# Patient Record
Sex: FEMALE | Race: WHITE | HISPANIC OR LATINO | Employment: PART TIME | URBAN - METROPOLITAN AREA
[De-identification: names, ages, dates, MRNs, and addresses within clinical notes are randomized per-mention and may not be internally consistent; named-entity substitution may affect disease eponyms.]

---

## 2017-09-27 ENCOUNTER — TRANSCRIBE ORDERS (OUTPATIENT)
Dept: LAB | Facility: CLINIC | Age: 37
End: 2017-09-27

## 2017-09-27 ENCOUNTER — APPOINTMENT (OUTPATIENT)
Dept: LAB | Facility: CLINIC | Age: 37
End: 2017-09-27
Payer: COMMERCIAL

## 2017-09-27 ENCOUNTER — ALLSCRIPTS OFFICE VISIT (OUTPATIENT)
Dept: OTHER | Facility: OTHER | Age: 37
End: 2017-09-27

## 2017-09-27 DIAGNOSIS — N64.4 MASTODYNIA: ICD-10-CM

## 2017-09-27 DIAGNOSIS — R10.11 RIGHT UPPER QUADRANT PAIN: ICD-10-CM

## 2017-09-27 DIAGNOSIS — E78.5 HYPERLIPIDEMIA: ICD-10-CM

## 2017-09-27 DIAGNOSIS — M25.439 EFFUSION OF WRIST: ICD-10-CM

## 2017-09-27 DIAGNOSIS — R10.12 LEFT UPPER QUADRANT PAIN: ICD-10-CM

## 2017-09-27 DIAGNOSIS — D64.9 ANEMIA: ICD-10-CM

## 2017-09-27 LAB
CRP SERPL QL: 18.4 MG/L
ERYTHROCYTE [SEDIMENTATION RATE] IN BLOOD: 28 MM/HOUR (ref 0–20)

## 2017-09-27 PROCEDURE — 85652 RBC SED RATE AUTOMATED: CPT

## 2017-09-27 PROCEDURE — 36415 COLL VENOUS BLD VENIPUNCTURE: CPT

## 2017-09-27 PROCEDURE — 86618 LYME DISEASE ANTIBODY: CPT

## 2017-09-27 PROCEDURE — 86140 C-REACTIVE PROTEIN: CPT

## 2017-09-27 PROCEDURE — 86430 RHEUMATOID FACTOR TEST QUAL: CPT

## 2017-09-28 LAB
B BURGDOR IGG SER IA-ACNC: 0.17
B BURGDOR IGM SER IA-ACNC: 0.19
RHEUMATOID FACT SER QL LA: NEGATIVE

## 2017-10-04 ENCOUNTER — ALLSCRIPTS OFFICE VISIT (OUTPATIENT)
Dept: OTHER | Facility: OTHER | Age: 37
End: 2017-10-04

## 2017-10-04 LAB
BILIRUB UR QL STRIP: NEGATIVE
CLARITY UR: NORMAL
COLOR UR: YELLOW
GLUCOSE (HISTORICAL): NEGATIVE
HGB UR QL STRIP.AUTO: NEGATIVE
KETONES UR STRIP-MCNC: NEGATIVE MG/DL
LEUKOCYTE ESTERASE UR QL STRIP: NEGATIVE
NITRITE UR QL STRIP: NEGATIVE
PH UR STRIP.AUTO: 6.5 [PH]
PROT UR STRIP-MCNC: NEGATIVE MG/DL
SP GR UR STRIP.AUTO: 1.01
UROBILINOGEN UR QL STRIP.AUTO: 0.2

## 2017-10-27 ENCOUNTER — TRANSCRIBE ORDERS (OUTPATIENT)
Dept: LAB | Facility: CLINIC | Age: 37
End: 2017-10-27

## 2017-10-27 ENCOUNTER — GENERIC CONVERSION - ENCOUNTER (OUTPATIENT)
Dept: OTHER | Facility: OTHER | Age: 37
End: 2017-10-27

## 2017-10-27 ENCOUNTER — APPOINTMENT (OUTPATIENT)
Dept: LAB | Facility: CLINIC | Age: 37
End: 2017-10-27

## 2017-10-27 DIAGNOSIS — D64.9 ANEMIA: ICD-10-CM

## 2017-10-27 DIAGNOSIS — E78.5 HYPERLIPIDEMIA: ICD-10-CM

## 2017-10-27 DIAGNOSIS — N64.4 MASTODYNIA: ICD-10-CM

## 2017-10-27 LAB
ALBUMIN SERPL BCP-MCNC: 3.6 G/DL (ref 3.5–5)
ALP SERPL-CCNC: 63 U/L (ref 46–116)
ALT SERPL W P-5'-P-CCNC: 26 U/L (ref 12–78)
ANION GAP SERPL CALCULATED.3IONS-SCNC: 6 MMOL/L (ref 4–13)
AST SERPL W P-5'-P-CCNC: 21 U/L (ref 5–45)
BASOPHILS # BLD AUTO: 0.04 THOUSANDS/ΜL (ref 0–0.1)
BASOPHILS NFR BLD AUTO: 1 % (ref 0–1)
BILIRUB SERPL-MCNC: 0.53 MG/DL (ref 0.2–1)
BUN SERPL-MCNC: 8 MG/DL (ref 5–25)
CALCIUM SERPL-MCNC: 8.9 MG/DL (ref 8.3–10.1)
CHLORIDE SERPL-SCNC: 105 MMOL/L (ref 100–108)
CHOLEST SERPL-MCNC: 227 MG/DL (ref 50–200)
CO2 SERPL-SCNC: 26 MMOL/L (ref 21–32)
CREAT SERPL-MCNC: 0.76 MG/DL (ref 0.6–1.3)
EOSINOPHIL # BLD AUTO: 0.11 THOUSAND/ΜL (ref 0–0.61)
EOSINOPHIL NFR BLD AUTO: 2 % (ref 0–6)
ERYTHROCYTE [DISTWIDTH] IN BLOOD BY AUTOMATED COUNT: 14.8 % (ref 11.6–15.1)
FERRITIN SERPL-MCNC: 9 NG/ML (ref 8–388)
GFR SERPL CREATININE-BSD FRML MDRD: 101 ML/MIN/1.73SQ M
GLUCOSE P FAST SERPL-MCNC: 94 MG/DL (ref 65–99)
HCT VFR BLD AUTO: 36.8 % (ref 34.8–46.1)
HDLC SERPL-MCNC: 54 MG/DL (ref 40–60)
HGB BLD-MCNC: 11.8 G/DL (ref 11.5–15.4)
IRON SERPL-MCNC: 43 UG/DL (ref 50–170)
LDLC SERPL CALC-MCNC: 129 MG/DL (ref 0–100)
LYMPHOCYTES # BLD AUTO: 1.55 THOUSANDS/ΜL (ref 0.6–4.47)
LYMPHOCYTES NFR BLD AUTO: 22 % (ref 14–44)
MCH RBC QN AUTO: 26.2 PG (ref 26.8–34.3)
MCHC RBC AUTO-ENTMCNC: 32.1 G/DL (ref 31.4–37.4)
MCV RBC AUTO: 82 FL (ref 82–98)
MONOCYTES # BLD AUTO: 0.39 THOUSAND/ΜL (ref 0.17–1.22)
MONOCYTES NFR BLD AUTO: 6 % (ref 4–12)
NEUTROPHILS # BLD AUTO: 4.96 THOUSANDS/ΜL (ref 1.85–7.62)
NEUTS SEG NFR BLD AUTO: 69 % (ref 43–75)
NRBC BLD AUTO-RTO: 0 /100 WBCS
PLATELET # BLD AUTO: 231 THOUSANDS/UL (ref 149–390)
PMV BLD AUTO: 10.4 FL (ref 8.9–12.7)
POTASSIUM SERPL-SCNC: 4.1 MMOL/L (ref 3.5–5.3)
PROT SERPL-MCNC: 7.6 G/DL (ref 6.4–8.2)
RBC # BLD AUTO: 4.5 MILLION/UL (ref 3.81–5.12)
SODIUM SERPL-SCNC: 137 MMOL/L (ref 136–145)
TRIGL SERPL-MCNC: 222 MG/DL
WBC # BLD AUTO: 7.08 THOUSAND/UL (ref 4.31–10.16)

## 2017-10-27 PROCEDURE — 85025 COMPLETE CBC W/AUTO DIFF WBC: CPT

## 2017-10-27 PROCEDURE — 82728 ASSAY OF FERRITIN: CPT

## 2017-10-27 PROCEDURE — 36415 COLL VENOUS BLD VENIPUNCTURE: CPT

## 2017-10-27 PROCEDURE — 80053 COMPREHEN METABOLIC PANEL: CPT

## 2017-10-27 PROCEDURE — 83540 ASSAY OF IRON: CPT

## 2017-10-27 PROCEDURE — 80061 LIPID PANEL: CPT

## 2017-11-03 ENCOUNTER — TRANSCRIBE ORDERS (OUTPATIENT)
Dept: MAMMOGRAPHY | Facility: CLINIC | Age: 37
End: 2017-11-03

## 2017-11-03 ENCOUNTER — HOSPITAL ENCOUNTER (OUTPATIENT)
Dept: ULTRASOUND IMAGING | Facility: CLINIC | Age: 37
Discharge: HOME | End: 2017-11-03
Payer: COMMERCIAL

## 2017-11-03 ENCOUNTER — HOSPITAL ENCOUNTER (OUTPATIENT)
Dept: MAMMOGRAPHY | Facility: CLINIC | Age: 37
Discharge: HOME | End: 2017-11-03
Payer: COMMERCIAL

## 2017-11-03 ENCOUNTER — APPOINTMENT (OUTPATIENT)
Dept: ULTRASOUND IMAGING | Facility: CLINIC | Age: 37
End: 2017-11-03
Payer: COMMERCIAL

## 2017-11-03 DIAGNOSIS — N64.4 MASTODYNIA: ICD-10-CM

## 2017-11-03 DIAGNOSIS — R10.12 LEFT UPPER QUADRANT PAIN: ICD-10-CM

## 2017-11-03 DIAGNOSIS — N63.0 LUMP OR MASS IN BREAST: Primary | ICD-10-CM

## 2017-11-03 PROCEDURE — 76642 ULTRASOUND BREAST LIMITED: CPT

## 2017-11-03 PROCEDURE — G0204 DX MAMMO INCL CAD BI: HCPCS

## 2017-11-03 PROCEDURE — G0279 TOMOSYNTHESIS, MAMMO: HCPCS

## 2017-11-04 ENCOUNTER — HOSPITAL ENCOUNTER (OUTPATIENT)
Dept: ULTRASOUND IMAGING | Facility: HOSPITAL | Age: 37
Discharge: HOME | End: 2017-11-04
Payer: COMMERCIAL

## 2017-11-04 DIAGNOSIS — R10.12 LEFT UPPER QUADRANT PAIN: ICD-10-CM

## 2017-11-04 DIAGNOSIS — R10.11 RIGHT UPPER QUADRANT PAIN: ICD-10-CM

## 2017-11-04 PROCEDURE — 76700 US EXAM ABDOM COMPLETE: CPT

## 2017-11-20 ENCOUNTER — GENERIC CONVERSION - ENCOUNTER (OUTPATIENT)
Dept: OTHER | Facility: OTHER | Age: 37
End: 2017-11-20

## 2017-11-20 DIAGNOSIS — Z01.419 ENCOUNTER FOR GYNECOLOGICAL EXAMINATION WITHOUT ABNORMAL FINDING: ICD-10-CM

## 2017-11-20 PROCEDURE — 87624 HPV HI-RISK TYP POOLED RSLT: CPT | Performed by: NURSE PRACTITIONER

## 2017-11-20 PROCEDURE — G0145 SCR C/V CYTO,THINLAYER,RESCR: HCPCS | Performed by: NURSE PRACTITIONER

## 2017-11-21 ENCOUNTER — LAB REQUISITION (OUTPATIENT)
Dept: LAB | Facility: HOSPITAL | Age: 37
End: 2017-11-21
Payer: COMMERCIAL

## 2017-11-21 DIAGNOSIS — Z01.419 ENCOUNTER FOR GYNECOLOGICAL EXAMINATION WITHOUT ABNORMAL FINDING: ICD-10-CM

## 2017-11-27 LAB — HPV RRNA GENITAL QL NAA+PROBE: ABNORMAL

## 2017-11-30 LAB
LAB AP GYN PRIMARY INTERPRETATION: NORMAL
Lab: NORMAL

## 2017-12-01 ENCOUNTER — GENERIC CONVERSION - ENCOUNTER (OUTPATIENT)
Dept: OTHER | Facility: OTHER | Age: 37
End: 2017-12-01

## 2017-12-04 ENCOUNTER — GENERIC CONVERSION - ENCOUNTER (OUTPATIENT)
Dept: OTHER | Facility: OTHER | Age: 37
End: 2017-12-04

## 2018-01-14 VITALS
RESPIRATION RATE: 18 BRPM | DIASTOLIC BLOOD PRESSURE: 76 MMHG | HEIGHT: 67 IN | WEIGHT: 190 LBS | HEART RATE: 134 BPM | TEMPERATURE: 97.8 F | OXYGEN SATURATION: 100 % | SYSTOLIC BLOOD PRESSURE: 120 MMHG | BODY MASS INDEX: 29.82 KG/M2

## 2018-01-16 NOTE — MISCELLANEOUS
Message  Return to work or school:   Odette Gonzalez is under my professional care   She was seen in my office on 9/27/17   She is able to return to work on  9/28/17            Signatures   Electronically signed by : KIET Thornton; Sep 27 2017 11:24AM EST                       (Author)

## 2018-01-22 VITALS
SYSTOLIC BLOOD PRESSURE: 120 MMHG | RESPIRATION RATE: 18 BRPM | HEIGHT: 67 IN | HEART RATE: 84 BPM | OXYGEN SATURATION: 99 % | WEIGHT: 190.13 LBS | BODY MASS INDEX: 29.84 KG/M2 | DIASTOLIC BLOOD PRESSURE: 80 MMHG | TEMPERATURE: 97 F

## 2018-01-22 VITALS
BODY MASS INDEX: 29.66 KG/M2 | SYSTOLIC BLOOD PRESSURE: 122 MMHG | RESPIRATION RATE: 18 BRPM | HEIGHT: 67 IN | TEMPERATURE: 97.8 F | HEART RATE: 97 BPM | DIASTOLIC BLOOD PRESSURE: 80 MMHG | OXYGEN SATURATION: 100 % | WEIGHT: 189 LBS

## 2018-01-22 VITALS
HEIGHT: 66 IN | BODY MASS INDEX: 30.05 KG/M2 | DIASTOLIC BLOOD PRESSURE: 80 MMHG | SYSTOLIC BLOOD PRESSURE: 117 MMHG | WEIGHT: 187 LBS

## 2018-01-23 NOTE — MISCELLANEOUS
Message  Sage spoke to patient on the phone regarding abnormal pap, she is to get a repeat pap next November 2018      Active Problems    1  Abdominal pain, left upper quadrant (789 02) (R10 12)   2  Abdominal pain, right upper quadrant (789 01) (R10 11)   3  Anemia (285 9) (D64 9)   4  Breast pain (611 71) (N64 4)   5  Dizziness (780 4) (R42)   6  Edema (782 3) (R60 9)   7  Encounter for cervical Pap smear with pelvic exam (V76 2,V72 31) (Z01 419)   8  Encounter for gynecological examination with Papanicolaou smear of cervix (V72 31)   (Z01 419)   9  Hyperlipidemia (272 4) (E78 5)   10  Need for influenza vaccination (V04 81) (Z23)   11  Urinary frequency (788 41) (R35 0)   12  Wrist swelling (719 03) (M25 439)    Current Meds   1  Simvastatin 20 MG Oral Tablet; TAKE 1 TABLET DAILY; Therapy: [id number] to (Chanell Ayon)  Requested for: 04CCS3052; Last   Rx:06Nov2017 Ordered    Allergies    1   No Known Drug Allergies    Signatures   Electronically signed by : Guillermina Forman RN; Dec  4 2017  9:09AM EST                       (Author)

## 2018-01-23 NOTE — RESULT NOTES
Verified Results  (1) THIN PREP PAP WITH IMAGING 71PKZ1998 11:57AM Estella Mcdowell     Test Name Result Flag Reference   LAB AP CASE REPORT (Report)     Gynecologic Cytology Report            Case: [id number]                  Authorizing Provider: BRITTANY Jolly Collected:      11/20/2017           First Screen:     ANTHONY Kaplan Received:      11/22/2017 0831        Specimen:  LIQUID-BASED PAP, SCREENING, Endocervical   HPV HIGH RISK RESULT (Report)     HPV, High Risk: HPV POS, HPV16 NEG, HPV18 NEG      Other High Risk HPV Positive, HPV 16 Negative, HPV 18 Negative  Specimen is positive for the DNA of any one of, or combination of the following high risk HPV types: 71,03,42,77,92,48,78,68,65,82,17,43  HPV types 16 and 18 DNA were undetectable or below the pre-set threshold  [employer] FDA approved Rosendo 4800 is utilized with strict adherence to the manufacturers instruction  manual to test for the presence of High-Risk HPV DNA, as well as HPV 16 and HPV 18  This instrument  has been validated by our laboratory and/or by the   A negative result does not preclude the presence of HPV infection because results depend on adequate  specimen collection, absence of inhibitors and sufficient DNA to be detected  Additionally, HPV negative  results are not intended to prevent women from proceeding to colposcopy if clinically warranted  Positive HPV test results indicate the presence of any one or more of the high risk types, but since patients  are often co-infected with low-risk types it does not rule out the presence of low-risk types in patients  with mixed infections  LAB AP GYN PRIMARY INTERPRETATION      Negative for intraepithelial lesion or malignancy  Electronically signed by ANTHONY Kaplan on 11/30/2017 at 11:57 AM   LAB AP GYN SPECIMEN ADEQUACY      Satisfactory for evaluation  Endocervical/transformation zone component present     LAB AP GYN ADDITIONAL INFORMATION (Report)     [employer] FDA approved ,  and ThinPrep Imaging System are   utilized with strict adherence to the 's instruction manual to   prepare gynecologic and non-gynecologic cytology specimens for the   production of ThinPrep slides as well as for gynecologic ThinPrep imaging  These processes have been validated by our laboratory and/or by the     The Pap test is not a diagnostic procedure and should not be used as the   sole means to detect cervical cancer  It is only a screening procedure to   aid in the detection of cervical cancer and its precursors  Both   false-negative and false-positive results have been experienced  Your   patient's test result should be interpreted in this context together with   the history and clinical findings

## 2018-07-17 ENCOUNTER — APPOINTMENT (OUTPATIENT)
Dept: LAB | Facility: CLINIC | Age: 38
End: 2018-07-17
Payer: COMMERCIAL

## 2018-07-17 ENCOUNTER — TRANSCRIBE ORDERS (OUTPATIENT)
Dept: LAB | Facility: CLINIC | Age: 38
End: 2018-07-17

## 2018-07-17 ENCOUNTER — OFFICE VISIT (OUTPATIENT)
Dept: FAMILY MEDICINE CLINIC | Facility: CLINIC | Age: 38
End: 2018-07-17
Payer: COMMERCIAL

## 2018-07-17 VITALS
HEIGHT: 66 IN | HEART RATE: 112 BPM | BODY MASS INDEX: 29.96 KG/M2 | WEIGHT: 186.4 LBS | DIASTOLIC BLOOD PRESSURE: 80 MMHG | RESPIRATION RATE: 18 BRPM | TEMPERATURE: 96.4 F | OXYGEN SATURATION: 99 % | SYSTOLIC BLOOD PRESSURE: 124 MMHG

## 2018-07-17 DIAGNOSIS — M79.642 BILATERAL HAND PAIN: ICD-10-CM

## 2018-07-17 DIAGNOSIS — M25.561 PAIN IN BOTH KNEES, UNSPECIFIED CHRONICITY: ICD-10-CM

## 2018-07-17 DIAGNOSIS — M25.562 PAIN IN BOTH KNEES, UNSPECIFIED CHRONICITY: ICD-10-CM

## 2018-07-17 DIAGNOSIS — M54.50 BILATERAL LOW BACK PAIN WITHOUT SCIATICA, UNSPECIFIED CHRONICITY: ICD-10-CM

## 2018-07-17 DIAGNOSIS — M79.641 BILATERAL HAND PAIN: ICD-10-CM

## 2018-07-17 DIAGNOSIS — E78.5 HYPERLIPIDEMIA, UNSPECIFIED HYPERLIPIDEMIA TYPE: Primary | ICD-10-CM

## 2018-07-17 LAB
ALBUMIN SERPL BCP-MCNC: 3.5 G/DL (ref 3.5–5)
ALP SERPL-CCNC: 54 U/L (ref 46–116)
ALT SERPL W P-5'-P-CCNC: 21 U/L (ref 12–78)
ANION GAP SERPL CALCULATED.3IONS-SCNC: 6 MMOL/L (ref 4–13)
AST SERPL W P-5'-P-CCNC: 18 U/L (ref 5–45)
BILIRUB SERPL-MCNC: 0.45 MG/DL (ref 0.2–1)
BUN SERPL-MCNC: 9 MG/DL (ref 5–25)
CALCIUM SERPL-MCNC: 8.6 MG/DL (ref 8.3–10.1)
CHLORIDE SERPL-SCNC: 105 MMOL/L (ref 100–108)
CHOLEST SERPL-MCNC: 236 MG/DL (ref 50–200)
CO2 SERPL-SCNC: 25 MMOL/L (ref 21–32)
CREAT SERPL-MCNC: 0.74 MG/DL (ref 0.6–1.3)
GFR SERPL CREATININE-BSD FRML MDRD: 103 ML/MIN/1.73SQ M
GLUCOSE P FAST SERPL-MCNC: 94 MG/DL (ref 65–99)
HDLC SERPL-MCNC: 39 MG/DL (ref 40–60)
LDLC SERPL CALC-MCNC: 128 MG/DL (ref 0–100)
NONHDLC SERPL-MCNC: 197 MG/DL
POTASSIUM SERPL-SCNC: 3.8 MMOL/L (ref 3.5–5.3)
PROT SERPL-MCNC: 7.4 G/DL (ref 6.4–8.2)
SODIUM SERPL-SCNC: 136 MMOL/L (ref 136–145)
TRIGL SERPL-MCNC: 344 MG/DL

## 2018-07-17 PROCEDURE — 80053 COMPREHEN METABOLIC PANEL: CPT | Performed by: PHYSICIAN ASSISTANT

## 2018-07-17 PROCEDURE — 36415 COLL VENOUS BLD VENIPUNCTURE: CPT | Performed by: PHYSICIAN ASSISTANT

## 2018-07-17 PROCEDURE — 3008F BODY MASS INDEX DOCD: CPT | Performed by: PHYSICIAN ASSISTANT

## 2018-07-17 PROCEDURE — 80061 LIPID PANEL: CPT | Performed by: PHYSICIAN ASSISTANT

## 2018-07-17 PROCEDURE — 99214 OFFICE O/P EST MOD 30 MIN: CPT | Performed by: PHYSICIAN ASSISTANT

## 2018-07-17 NOTE — PATIENT INSTRUCTIONS
Patient has been advised on the importance of exercise  I did refer her to physical therapy and occupational therapy for bilateral hand pain, bilateral knee pain and low back pain  The importance of doing these exercises regularly at home even when therapy is discontinued has been discussed  CMP and lipid profile today  Cholesterol elevated at 227 in the fall of 2017  Low-cholesterol diet has been discussed since she has not been following a diet at this time  Follow up with Kelli if there is no improvement with therapy in 2 months

## 2018-07-17 NOTE — PROGRESS NOTES
Assessment/Plan:    Labs from September 2017 done for joint pain in the past do not reveal any significant findings  Patient Instructions   Patient has been advised on the importance of exercise  I did refer her to physical therapy and occupational therapy for bilateral hand pain, bilateral knee pain and low back pain  The importance of doing these exercises regularly at home even when therapy is discontinued has been discussed  CMP and lipid profile today  Cholesterol elevated at 227 in the fall of 2017  Low-cholesterol diet has been discussed since she has not been following a diet at this time  Follow up with Kelli if there is no improvement with therapy in 2 months  M*Modal software was used to dictate this note  It may contain errors with dictating incorrect words/spelling  Please contact provider directly for any questions  Diagnoses and all orders for this visit:    Hyperlipidemia, unspecified hyperlipidemia type  -     Comprehensive metabolic panel  -     Lipid panel    Bilateral hand pain  -     Ambulatory referral to Occupational Therapy; Future    Pain in both knees, unspecified chronicity  -     Ambulatory referral to Physical Therapy; Future    Bilateral low back pain without sciatica, unspecified chronicity  -     Ambulatory referral to Physical Therapy; Future          Subjective:      Patient ID: Marcela Xiong is a 45 y o  female  Patient presents today for evaluation of bilateral hand pain, bilateral knee pain and low back pain over the past 2 months  She did have blood work done for different arthritis in September 2017 with no significant findings  She states the pain in the above area started about 2 months ago  She denies any specific injury  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  She denies any neck pain, shoulder pain, elbow pain, numbness or tingling or radiation of pain down her lower extremities or any loss of bladder or bowel control    She does notice some numbness and tingling of all digits of both hands  No current treatment  Denies any swelling of knees  She states that she has never exercised or done any type of stretches  She is also requesting repeat blood work for elevated cholesterol in September 2017  She is aware of the elevated cholesterol but she states that she has not been following a low-cholesterol diet  She is not currently taking any medications  The following portions of the patient's history were reviewed and updated as appropriate:   She  has no past medical history on file  She   Patient Active Problem List    Diagnosis Date Noted    Bilateral hand pain 07/17/2018    Bilateral knee pain 07/17/2018    Low back pain 07/17/2018    Hyperlipidemia 10/27/2017     She  has a past surgical history that includes Tubal ligation  Her family history includes Arthritis in her mother; Diabetes in her family  She  reports that she quit smoking about 2 months ago  She has never used smokeless tobacco  She reports that she drinks alcohol  She reports that she does not use drugs  No current outpatient prescriptions on file  No current facility-administered medications for this visit  No current outpatient prescriptions on file prior to visit  No current facility-administered medications on file prior to visit  She has No Known Allergies       Review of Systems   Gastrointestinal:        As stated in HPI   Genitourinary:        As stated in HPI   Musculoskeletal:        As stated in HPI   Neurological:        As stated in HPI         Objective:      /80 (BP Location: Right arm, Patient Position: Sitting, Cuff Size: Large)   Pulse (!) 112   Temp (!) 96 4 °F (35 8 °C) (Tympanic)   Resp 18   Ht 5' 6" (1 676 m)   Wt 84 6 kg (186 lb 6 4 oz)   LMP 07/06/2018 (Exact Date)   SpO2 99%   Breastfeeding?  No   BMI 30 09 kg/m²          Physical Exam   Constitutional: She appears well-developed and well-nourished  No distress  Musculoskeletal:   Cervical spine:  Nontender, full range of motion  Shoulders:  Nontender full range of motion  Hands:  No swelling, no atrophy, full range of motion of her wrists and digits  No specific tenderness at this time  Negative Tinel's and Phalen's  Bilateral knees:  No effusion, full range of motion bilaterally  Diffuse joint line tenderness bilaterally  Lumbar spine:  Diffuse tenderness, she does have difficulty with forward flexion because of reduce flexibility of hamstrings

## 2018-08-06 ENCOUNTER — EVALUATION (OUTPATIENT)
Dept: PHYSICAL THERAPY | Facility: MEDICAL CENTER | Age: 38
End: 2018-08-06
Attending: PHYSICIAN ASSISTANT
Payer: COMMERCIAL

## 2018-08-06 DIAGNOSIS — M25.562 PAIN IN BOTH KNEES, UNSPECIFIED CHRONICITY: Primary | ICD-10-CM

## 2018-08-06 DIAGNOSIS — M54.50 BILATERAL LOW BACK PAIN WITHOUT SCIATICA, UNSPECIFIED CHRONICITY: ICD-10-CM

## 2018-08-06 DIAGNOSIS — M25.561 PAIN IN BOTH KNEES, UNSPECIFIED CHRONICITY: Primary | ICD-10-CM

## 2018-08-06 PROCEDURE — 97161 PT EVAL LOW COMPLEX 20 MIN: CPT | Performed by: PHYSICAL THERAPIST

## 2018-08-06 PROCEDURE — G8978 MOBILITY CURRENT STATUS: HCPCS | Performed by: PHYSICAL THERAPIST

## 2018-08-06 PROCEDURE — G8979 MOBILITY GOAL STATUS: HCPCS | Performed by: PHYSICAL THERAPIST

## 2018-08-06 NOTE — PROGRESS NOTES
PT Evaluation     Today's date: 2018  Patient name: Viridiana Stout  : 1980  MRN: [id number]  Referring provider: Robin Bosworth, PA-C  Dx:   Encounter Diagnoses   Name Primary?  Pain in both knees, unspecified chronicity     Bilateral low back pain without sciatica, unspecified chronicity                   Assessment  Impairments: abnormal coordination, abnormal muscle firing, abnormal or restricted ROM, abnormal movement, impaired physical strength, lacks appropriate home exercise program, pain with function, weight-bearing intolerance, poor posture  and poor body mechanics    Assessment details: Viridiana Stout is a 45 y o  y/o female who presents with complaints of bilateral knee pain and low back pain that began approximately 4 months ago without a known GAMA  The patient's greatest concerns are pain while working as a   Patient demonstrates hip accessory weakness and posterior chain neuromuscular control deficit that decreases ability to perform functional activities without compensation patterns  Primary movement impairment diagnosis of bilateral PFJ dysfunction and lumbar hypomobility resulting in pathoanatomical symptoms of bilateral knee pain and low back pain, which limits her ability to sit, stand, walk, and squat without pain  Pt  will benefit from skilled PT services that includes manual therapy techniques to enhance tissue extensibility, neuromuscular re-education to facilitate motor control, therapeutic exercise to increase functional mobility, and modalities prn to reduce pain and inflammation  Understanding of Dx/Px/POC: good   Prognosis: good    Goals  Short Term Goals: to be achieved by 4 weeks  1) Patient to be independent with basic HEP  2) Decrease pain to 3/10 at its worst   3) Increase lumbar spine ROM by 10% in all deficient planes  4) Increase LE strength by 1/2 MMT grade in all deficient planes    5) Patient to report increased ease with performing her work duties that involve ascending/descending stairs, walking, and moving from sitting to standing  6) Increase standing and ambulatory tolerance to at least 30 minutes with minimal pain  Long Term Goals: to be achieved by discharge  1) FOTO equal to or greater than 67   2) Patient to be independent with comprehensive HEP  3) Abolish pain for improved quality of life  4) Lumbar spine ROM WFL all planes to improve a/iadls  5) Increase LE strength to 5/5 MMT grade in all planes to improve a/iadls  6) Increase standing and ambulatory tolerance to at least 1 hour without pain  Plan  Patient would benefit from: PT eval  Planned modality interventions: thermotherapy: hydrocollator packs  Planned therapy interventions: manual therapy, massage, neuromuscular re-education, patient education, postural training, strengthening, stretching, therapeutic exercise, graded exercise, functional ROM exercises, activity modification, abdominal trunk stabilization, balance/weight bearing training and body mechanics training  Frequency: 2x week  Duration in weeks: 8  Treatment plan discussed with: patient      Subjective Evaluation    History of Present Illness  Onset date: ~4 months ago low back pain and bilateral knee pain began  Mechanism of injury: Patient reports that the pain in her low back and bilateral knees began approximately 4 weeks ago  She reports that the pain feels sharp in the knees when she stands a lot and when walking  Pain in the right knee is worse than the left  Her back pain is achey and dull  Patient's occupation as a  5 days a week increases her pain when standing, bending, and moving from sitting to standing  Patient would like to be able to work pain free    Pain  Current pain ratin  At best pain ratin  At worst pain ratin  Quality: sharp and dull ache      Diagnostic Tests  No diagnostic tests performed  Treatments  Current treatment: physical therapy  Patient Goals  Patient goals for therapy: decreased pain, increased motion, increased strength and independence with ADLs/IADLs        Objective     Special Questions  Negative for night pain and history of cancer    Postural Observations  Seated posture: fair  Standing posture: fair  Correction of posture: has no consistent effect        Palpation   Left   Tenderness of the erector spinae, lumbar paraspinals and quadratus lumborum  Right Tenderness of the erector spinae, lumbar paraspinals and quadratus lumborum  Additional Palpation Details  (+) TTP B/L ITBs    Tenderness     Lumbar Spine  No tenderness in the spinous process  Left Hip   No tenderness in the PSIS  Right Hip   No tenderness in the PSIS  Neurological Testing     Sensation     Lumbar   Left   Intact: light touch    Right   Intact: light touch    Active Range of Motion     Lumbar   Flexion: Active lumbar flexion: 25% limited, tightness  Extension: Active lumbar extension: 25% limited, painful  Left lateral flexion: Active left lumbar lateral flexion: 25% limited  Right lateral flexion: Active right lumbar lateral flexion: 25% limited, painful  Left rotation: Active left lumbar rotation: 10% limited, no pain  Right rotation: Active right lumbar rotation: 10% limited, no pain     Left Hip   Normal active range of motion    Right Hip   Normal active range of motion    Passive Range of Motion     Additional Passive Range of Motion Details  No pain with lumbar P-A    Strength/Myotome Testing     Left Hip   Planes of Motion   Flexion: 5  Extension: 5  Abduction: 4  Adduction: 5    Right Hip   Planes of Motion   Flexion: 5  Extension: 5  Abduction: 4  Adduction: 5    Left Knee   Flexion: 4  Extension: 4    Right Knee   Flexion: 4  Extension: 4    Left Ankle/Foot   Dorsiflexion: 5  Plantar flexion: 5    Right Ankle/Foot   Dorsiflexion: 5  Plantar flexion: 5    Muscle Activation   Patient unable to activate left transverse abdominals, left multifidus, right transverse abdominals and right multifidus  Tests     Lumbar   Negative repeated flexion, repeated extension and prone instability   Left   Negative passive SLR  Right   Negative passive SLR  Left Pelvic Girdle/Sacrum   Negative: sacrum compression and gapping  Right Pelvic Girdle/Sacrum   Negative: sacrum compression and gapping  Left Hip   Positive Zelda and piriformis  Negative scour and SI compression  Eder: Positive  Right Hip   Positive Zelda and piriformis  Negative scour and SI compression  Eder: Positive  Left Knee   Positive patellar compression  Negative anterior Lachman, lateral Ron, medial Ron, valgus stress test at 0 degrees, valgus stress test at 30 degrees, varus stress test at 0 degrees and varus stress test at 30 degrees  Right Knee   Positive patellar compression  Negative anterior Lachman, lateral Ron, medial Ron, valgus stress test at 0 degrees, valgus stress test at 30 degrees, varus stress test at 0 degrees and varus stress test at 30 degrees  Functional Assessment   Squat   Left tibial anterior translation beyond toes and right tibial anterior translation beyond toes  General Comments     Knee Comments  No swelling noted      Precautions: None    Daily Treatment Diary     Manual  8/6                                                                                 Exercise Diary                           Prone press ups 2x10            Piriformis stretch 3x30s            Bridges GR 10x 10s            Clams GR 30x                                                                                                                                                                                                                   Modalities                                                               Glenny Rincon, PT  8/6/2018,10:02 AM

## 2018-08-17 ENCOUNTER — OFFICE VISIT (OUTPATIENT)
Dept: PHYSICAL THERAPY | Facility: MEDICAL CENTER | Age: 38
End: 2018-08-17
Attending: PHYSICIAN ASSISTANT
Payer: COMMERCIAL

## 2018-08-17 DIAGNOSIS — M25.562 PAIN IN BOTH KNEES, UNSPECIFIED CHRONICITY: Primary | ICD-10-CM

## 2018-08-17 DIAGNOSIS — M25.561 PAIN IN BOTH KNEES, UNSPECIFIED CHRONICITY: Primary | ICD-10-CM

## 2018-08-17 PROCEDURE — 97140 MANUAL THERAPY 1/> REGIONS: CPT | Performed by: PHYSICAL THERAPIST

## 2018-08-17 PROCEDURE — 97110 THERAPEUTIC EXERCISES: CPT | Performed by: PHYSICAL THERAPIST

## 2018-08-17 PROCEDURE — 97112 NEUROMUSCULAR REEDUCATION: CPT | Performed by: PHYSICAL THERAPIST

## 2018-08-17 NOTE — PROGRESS NOTES
Daily Note     Today's date: 2018  Patient name: Ramone Quispe  : 1980  MRN: [id number]  Referring provider: Catrina Prather PA-C  Dx:   Encounter Diagnosis     ICD-10-CM    1  Pain in both knees, unspecified chronicity M25 561     M25 562                   Subjective: Patient reports that she is feeling better  Objective: See treatment diary below    Precautions: None    Daily Treatment Diary     Manual             Piriformis release  AZ b/l                                                                   Exercise Diary                           Prone press ups 2x10 3x10           Piriformis stretch 3x30s 3x30s           Bridges GR 10x 10s GR 10x 10s           Clams GR 30x GR 30x           Ham stretch   3x30s supine           Runner's stretch  3x30s                                                                                                                                                                                        Modalities                                                           Assessment:  Patient demonstrates ability to perform press ups and squats without pain  No pain throughout tx session  Glute abd and ext strength deficit noted  Hamstring tissue extensibility deficit  Progress towards pain free functional activities as able  Tolerated treatment well  Patient would benefit from continued PT      Plan: Continue per plan of care

## 2018-08-24 ENCOUNTER — OFFICE VISIT (OUTPATIENT)
Dept: PHYSICAL THERAPY | Facility: MEDICAL CENTER | Age: 38
End: 2018-08-24
Attending: PHYSICIAN ASSISTANT
Payer: COMMERCIAL

## 2018-08-24 DIAGNOSIS — M25.562 PAIN IN BOTH KNEES, UNSPECIFIED CHRONICITY: ICD-10-CM

## 2018-08-24 DIAGNOSIS — M54.50 BILATERAL LOW BACK PAIN WITHOUT SCIATICA, UNSPECIFIED CHRONICITY: Primary | ICD-10-CM

## 2018-08-24 DIAGNOSIS — M25.561 PAIN IN BOTH KNEES, UNSPECIFIED CHRONICITY: ICD-10-CM

## 2018-08-24 PROCEDURE — 97140 MANUAL THERAPY 1/> REGIONS: CPT | Performed by: PHYSICAL THERAPIST

## 2018-08-24 PROCEDURE — 97112 NEUROMUSCULAR REEDUCATION: CPT | Performed by: PHYSICAL THERAPIST

## 2018-08-24 NOTE — PROGRESS NOTES
Daily Note     Today's date: 2018  Patient name: Dayo Britton  : 1980  MRN: [id number]  Referring provider: Gabrielle Donis PA-C  Dx:   Encounter Diagnosis     ICD-10-CM    1  Bilateral low back pain without sciatica, unspecified chronicity M54 5    2  Pain in both knees, unspecified chronicity M25 561     M25 562                   Subjective: Patient reports that she is feeling better  Objective: See treatment diary below    Precautions: None    Daily Treatment Diary     Manual            Piriformis release  AZ b/l AZ b/l                                                                  Exercise Diary                           Prone press ups 2x10 3x10 3x10 c/ OP last set          Piriformis stretch 3x30s 3x30s 3x30s          Bridges GR 10x 10s GR 10x 10s Blue 10x10s          Clams GR 30x GR 30x Blue 30x          Ham stretch   3x30s supine 3x30s supine          Runner's stretch  3x30s 3x30s                                                                                                                                                                                       Modalities              MH   10'                                          Assessment:  Patient presents without pain in her low back  Mild lumbar hypomobility noted with press ups that improved post tx  No pain into B/L LEs  Patient education on importance of performing HEP in order to enhance hamstring extensibility to decrease strain on low back during household chores and while at work  Progress towards HEP as goals are achieved and patient is pain free  Tolerated treatment well  Patient would benefit from continued PT      Plan: Continue per plan of care

## 2018-08-31 ENCOUNTER — APPOINTMENT (OUTPATIENT)
Dept: PHYSICAL THERAPY | Facility: MEDICAL CENTER | Age: 38
End: 2018-08-31
Attending: PHYSICIAN ASSISTANT
Payer: COMMERCIAL

## 2018-09-07 ENCOUNTER — OFFICE VISIT (OUTPATIENT)
Dept: PHYSICAL THERAPY | Facility: MEDICAL CENTER | Age: 38
End: 2018-09-07
Attending: PHYSICIAN ASSISTANT
Payer: COMMERCIAL

## 2018-09-07 DIAGNOSIS — M54.50 BILATERAL LOW BACK PAIN WITHOUT SCIATICA, UNSPECIFIED CHRONICITY: Primary | ICD-10-CM

## 2018-09-07 DIAGNOSIS — M25.561 PAIN IN BOTH KNEES, UNSPECIFIED CHRONICITY: ICD-10-CM

## 2018-09-07 DIAGNOSIS — M25.562 PAIN IN BOTH KNEES, UNSPECIFIED CHRONICITY: ICD-10-CM

## 2018-09-07 PROCEDURE — 97140 MANUAL THERAPY 1/> REGIONS: CPT | Performed by: PHYSICAL THERAPIST

## 2018-09-07 PROCEDURE — 97112 NEUROMUSCULAR REEDUCATION: CPT | Performed by: PHYSICAL THERAPIST

## 2018-09-07 PROCEDURE — 97110 THERAPEUTIC EXERCISES: CPT | Performed by: PHYSICAL THERAPIST

## 2018-09-07 PROCEDURE — 97164 PT RE-EVAL EST PLAN CARE: CPT | Performed by: PHYSICAL THERAPIST

## 2018-09-07 NOTE — PROGRESS NOTES
PT Evaluation     Today's date: 2018  Patient name: Odalys Serrano  : 1980  MRN: [id number]  Referring provider: Jaime Mares PA-C  Dx:   Encounter Diagnoses   Name Primary?  Bilateral low back pain without sciatica, unspecified chronicity Yes    Pain in both knees, unspecified chronicity        Start Time: 1130  Stop Time: 1215  Total time in clinic (min): 45 minutes    Assessment  Impairments: abnormal coordination, abnormal muscle firing, abnormal movement, impaired physical strength, lacks appropriate home exercise program, poor posture and poor body mechanics    Assessment details: Odalys Serrano is a 45 y o  y/o female who presents for re-eval for low back and bilateral knee pain that began approximately 4 months ago without a known GAMA  The patient's greatest concerns are pain while working as a   Patient demonstrates hip accessory weakness and posterior chain neuromuscular control deficit that decreases ability to perform functional activities without compensation patterns  Primary movement impairment diagnosis of bilateral PFJ dysfunction and lumbar hypomobility resulting in pathoanatomical symptoms of bilateral knee and low back pain, which limits her ability to sit, stand, walk, and squat without pain  Pt  will benefit from skilled PT services that includes manual therapy techniques to enhance tissue extensibility, neuromuscular re-education to facilitate motor control, therapeutic exercise to increase functional mobility, and modalities prn to reduce pain and inflammation    Understanding of Dx/Px/POC: good   Prognosis: good    Goals  Short Term Goals: to be achieved by 4 weeks  1) Patient to be independent with basic HEP- achieved  2) Decrease pain to 3/10 at its worst- achieved  3) Increase lumbar spine ROM by 10% in all deficient planes- achieved  4) Increase LE strength by 1/2 MMT grade in all deficient planes- achieved  5) Patient to report increased ease with performing her work duties that involve ascending/descending stairs, walking, and moving from sitting to standing- achieved  6) Increase standing and ambulatory tolerance to at least 30 minutes with minimal pain- achieved    Long Term Goals: to be achieved by discharge  1) FOTO equal to or greater than 67- in progress  2) Patient to be independent with comprehensive HEP- achieved  3) Abolish pain for improved quality of life- in progress  4) Lumbar spine ROM WFL all planes to improve a/iadls- in progress  5) Increase LE strength to 5/5 MMT grade in all planes to improve a/iadls- in progress  6) Increase standing and ambulatory tolerance to at least 1 hour without pain- achieved    Plan  Patient would benefit from: PT eval  Planned modality interventions: thermotherapy: hydrocollator packs  Planned therapy interventions: manual therapy, massage, neuromuscular re-education, patient education, postural training, strengthening, stretching, therapeutic exercise, graded exercise, functional ROM exercises, activity modification, abdominal trunk stabilization, balance/weight bearing training and body mechanics training  Frequency: 2x week  Duration in weeks: 6  Treatment plan discussed with: patient      Subjective Evaluation    History of Present Illness  Onset date: ~4 months ago low back pain and bilateral knee pain began  Mechanism of injury: Patient reports that the pain in her low back and bilateral knees began approximately 4 weeks ago  She reports that the pain feels sharp in the knees when she stands a lot and when walking  Pain in the right knee is worse than the left  Her back pain is achey and dull  Patient's occupation as a  5 days a week increases her pain when standing, bending, and moving from sitting to standing  Patient would like to be able to work pain free    Pain  Current pain ratin  At best pain ratin  At worst pain ratin  Quality: ache      Diagnostic Tests  No diagnostic tests performed  Treatments  Current treatment: physical therapy  Patient Goals  Patient goals for therapy: decreased pain, increased motion, increased strength and independence with ADLs/IADLs        Objective     Special Questions  Negative for night pain and history of cancer    Postural Observations  Seated posture: fair  Standing posture: fair  Correction of posture: has no consistent effect        Palpation   Left   Tenderness of the erector spinae, lumbar paraspinals and quadratus lumborum  Right Tenderness of the erector spinae, lumbar paraspinals and quadratus lumborum  Additional Palpation Details  (+) TTP B/L ITBs    Tenderness     Lumbar Spine  No tenderness in the spinous process  Left Hip   No tenderness in the PSIS  Right Hip   No tenderness in the PSIS       Neurological Testing     Sensation     Lumbar   Left   Intact: light touch    Right   Intact: light touch    Active Range of Motion     Lumbar   Flexion: Active lumbar flexion: WFL  Extension: Active lumbar extension: WFL  Left lateral flexion: Active left lumbar lateral flexion: WFL  Right lateral flexion: Active right lumbar lateral flexion: WFL  Left rotation: Active left lumbar rotation: WFL  Right rotation: Active right lumbar rotation: WFL  Left Hip   Normal active range of motion    Right Hip   Normal active range of motion    Passive Range of Motion     Additional Passive Range of Motion Details  No pain with lumbar P-A    Strength/Myotome Testing     Left Hip   Planes of Motion   Flexion: 5  Extension: 5  Abduction: 4+  Adduction: 5    Right Hip   Planes of Motion   Flexion: 5  Extension: 5  Abduction: 4+  Adduction: 5    Left Knee   Flexion: 4  Extension: 4    Right Knee   Flexion: 5  Extension: 5    Left Ankle/Foot   Dorsiflexion: 5  Plantar flexion: 5    Right Ankle/Foot   Dorsiflexion: 5  Plantar flexion: 5    Muscle Activation   Core strength 3+/5     Functional Assessment   Squat   Patient demonstrates ability to perform functional squat; anterior tibial translation beyond toes with single leg squat bilaterally    General Comments     Knee Comments  No swelling noted  Leonela Lobato, PT  2018,12:28 PM    Daily Note     Today's date: 2018  Patient name: Mumtaz Boyle  : 1980  MRN: [id number]  Referring provider: Mellisa Giron PA-C  Dx:   Encounter Diagnosis     ICD-10-CM    1  Bilateral low back pain without sciatica, unspecified chronicity M54 5    2  Pain in both knees, unspecified chronicity M25 561     M25 562        Start Time: 1130  Stop Time: 1215  Total time in clinic (min): 45 minutes    Subjective: Patient reports that she has no pain today  Objective: See treatment diary below    Precautions: None    Daily Treatment Diary     Manual           Piriformis release  AZ b/l AZ b/l AZ                                                                 Exercise Diary                           Prone press ups 2x10 3x10 3x10 c/ OP last set 3x10         Piriformis stretch 3x30s 3x30s 3x30s 3x30s         Bridges GR 10x 10s GR 10x 10s Blue 10x10s Blue 10x 10s         Clams GR 30x GR 30x Blue 30x Blue 30x         Ham stretch   3x30s supine 3x30s supine 3x30s supine         Runner's stretch  3x30s 3x30s 3x30s                                                                                                                                                                                      Modalities              MH   10' dec                                         Assessment:  Patient presents without pain in her low back, or bilateral knees  Press ups increase pain in b/l hands that have been painful from work for a while  Plan to progress towards HEP as goals are achieved and patient is pain free within 1-2 visits  Tolerated treatment well  Patient would benefit from continued PT and HEP  Plan: Continue per plan of care

## 2018-09-14 ENCOUNTER — OFFICE VISIT (OUTPATIENT)
Dept: PHYSICAL THERAPY | Facility: MEDICAL CENTER | Age: 38
End: 2018-09-14
Attending: PHYSICIAN ASSISTANT
Payer: COMMERCIAL

## 2018-09-14 DIAGNOSIS — M25.562 PAIN IN BOTH KNEES, UNSPECIFIED CHRONICITY: ICD-10-CM

## 2018-09-14 DIAGNOSIS — M54.50 BILATERAL LOW BACK PAIN WITHOUT SCIATICA, UNSPECIFIED CHRONICITY: Primary | ICD-10-CM

## 2018-09-14 DIAGNOSIS — M25.561 PAIN IN BOTH KNEES, UNSPECIFIED CHRONICITY: ICD-10-CM

## 2018-09-14 PROCEDURE — 97110 THERAPEUTIC EXERCISES: CPT | Performed by: PHYSICAL THERAPIST

## 2018-09-14 PROCEDURE — 97112 NEUROMUSCULAR REEDUCATION: CPT | Performed by: PHYSICAL THERAPIST

## 2018-09-14 NOTE — PROGRESS NOTES
Daily Note     Today's date: 2018  Patient name: Dina Conroy  : 1980  MRN: [id number]  Referring provider: Antionette Edgar PA-C  Dx:   Encounter Diagnosis     ICD-10-CM    1  Bilateral low back pain without sciatica, unspecified chronicity M54 5    2  Pain in both knees, unspecified chronicity M25 561     M25 562                   Subjective: Patient states that her back and knees feel good  She has no pain overall  Objective: See treatment diary below    Precautions: None    Daily Treatment Diary     Manual          Piriformis release  AZ b/l AZ b/l AZ np                                                                Exercise Diary                           Prone press ups 2x10 3x10 3x10 c/ OP last set 3x10 3x10        Piriformis stretch 3x30s 3x30s 3x30s 3x30s 3x30s        Bridges GR 10x 10s GR 10x 10s Blue 10x10s Blue 10x 10s Blue 10x 10s        Clams GR 30x GR 30x Blue 30x Blue 30x Blue 30x        Ham stretch   3x30s supine 3x30s supine 3x30s supine 3x30s supine        Runner's stretch  3x30s 3x30s 3x30s 3x30s                                                                                                                                                                                     Modalities              MH   10' dec 10'                                        Assessment:  Dina Conroy has been compliant with attending PT and home exercise program since initial eval   Luh Ro  has made improvements in objective data since initial evalulation and has achieved all goals  Patient reports having returned to their prior level or function  Patient provided with updated Home Exercise Program, all questions answered, verbalized understanding and agreement to plan of care   Thus it was mutually decided to discontinue this episode of care and transition to Home Exercise Program

## 2018-12-05 ENCOUNTER — OFFICE VISIT (OUTPATIENT)
Dept: OBGYN CLINIC | Facility: CLINIC | Age: 38
End: 2018-12-05
Payer: COMMERCIAL

## 2018-12-05 VITALS
BODY MASS INDEX: 31.31 KG/M2 | HEART RATE: 86 BPM | SYSTOLIC BLOOD PRESSURE: 122 MMHG | DIASTOLIC BLOOD PRESSURE: 82 MMHG | WEIGHT: 194 LBS

## 2018-12-05 DIAGNOSIS — Z01.419 ENCOUNTER FOR ANNUAL ROUTINE GYNECOLOGICAL EXAMINATION: Primary | ICD-10-CM

## 2018-12-05 PROCEDURE — 87624 HPV HI-RISK TYP POOLED RSLT: CPT | Performed by: NURSE PRACTITIONER

## 2018-12-05 PROCEDURE — 99395 PREV VISIT EST AGE 18-39: CPT | Performed by: NURSE PRACTITIONER

## 2018-12-05 PROCEDURE — 3725F SCREEN DEPRESSION PERFORMED: CPT | Performed by: NURSE PRACTITIONER

## 2018-12-05 PROCEDURE — G0145 SCR C/V CYTO,THINLAYER,RESCR: HCPCS | Performed by: NURSE PRACTITIONER

## 2018-12-05 NOTE — PROGRESS NOTES
Annual Exam  1  Encounter for annual routine gynecological examination  Liquid-based pap, screening       Assessment        well woman          Plan      All questions answered  Breast self exam technique reviewed and patient encouraged to perform self-exam monthly  Contraception: tubal ligation  Discussed healthy lifestyle modifications  Follow up in 1 year  Thin prep Pap smear  PAP results will be available in 2 weeks  Pt verbalized understanding of all discussed  Subjective      Matilde Baires is a 45 y o   female who presents for annual well woman exam  Periods are regular every 28-30 days, lasting 5 days  No intermenstrual bleeding, spotting, or discharge  Pt has a Hx of WNL PAP and Positive HVP 2017  1 parter x 1 year denies domestic violence  Current contraception: tubal ligation  History of abnormal Pap smear: WNL PAP HPV positive  Family history of uterine or ovarian cancer: no  Regular self breast exam: yes  History of abnormal mammogram: N/A  Family history of breast cancer: no  History of abnormal lipids: Hx of hyperlipedemia, states no medication  Menstrual History:  OB History      Para Term  AB Living    4 4 4     4    SAB TAB Ectopic Multiple Live Births                      Menarche age: 5  No LMP recorded  Period Duration (Days): 5 days  Period Pattern: (!) Irregular ("sometimes i get it twice a month and sometimes i dont get a period  ")  Menstrual Flow: Light  Menstrual Control: Maxi pad, Thin pad  Dysmenorrhea: (!) Moderate  Dysmenorrhea Symptoms: Cramping    The following portions of the patient's history were reviewed and updated as appropriate: allergies, current medications, past family history, past medical history, past social history, past surgical history and problem list     Review of Systems  Pertinent items are noted in HPI        Objective      /82 (BP Location: Left arm, Patient Position: Sitting, Cuff Size: Standard)   Pulse 86   Wt 88 kg (194 lb)   BMI 31 31 kg/m²     General:   alert and oriented, in no acute distress, alert, appears stated age and cooperative   Heart: regular rate and rhythm, S1, S2 normal, no murmur, click, rub or gallop   Lungs: clear to auscultation bilaterally   Thyroid: Negative masses   Abdomen: soft, non-tender, without masses or organomegaly   Vulva: normal   Vagina: normal mucosa   Cervix: no cervical motion tenderness and no lesions   Uterus: normal size, normal shape and consistency   Adnexa: normal adnexa   Breasts: NT, n egative masses, discharge or dimpling

## 2018-12-09 LAB
HPV HR 12 DNA CVX QL NAA+PROBE: POSITIVE
HPV16 DNA CVX QL NAA+PROBE: NEGATIVE
HPV18 DNA CVX QL NAA+PROBE: NEGATIVE

## 2018-12-11 PROBLEM — R87.810 CERVICAL HIGH RISK HPV (HUMAN PAPILLOMAVIRUS) TEST POSITIVE: Status: ACTIVE | Noted: 2018-12-11

## 2018-12-11 LAB
LAB AP GYN PRIMARY INTERPRETATION: NORMAL
Lab: NORMAL
PATH INTERP SPEC-IMP: NORMAL

## 2018-12-22 ENCOUNTER — HOSPITAL ENCOUNTER (EMERGENCY)
Facility: HOSPITAL | Age: 38
Discharge: HOME | End: 2018-12-22
Attending: EMERGENCY MEDICINE
Payer: COMMERCIAL

## 2018-12-22 VITALS
BODY MASS INDEX: 31.02 KG/M2 | RESPIRATION RATE: 18 BRPM | OXYGEN SATURATION: 99 % | SYSTOLIC BLOOD PRESSURE: 147 MMHG | WEIGHT: 192.2 LBS | HEART RATE: 103 BPM | TEMPERATURE: 96.9 F | DIASTOLIC BLOOD PRESSURE: 58 MMHG

## 2018-12-22 DIAGNOSIS — M25.531 BILATERAL WRIST PAIN: Primary | ICD-10-CM

## 2018-12-22 DIAGNOSIS — M25.532 BILATERAL WRIST PAIN: Primary | ICD-10-CM

## 2018-12-22 DIAGNOSIS — G56.00 CARPAL TUNNEL SYNDROME: ICD-10-CM

## 2018-12-22 PROCEDURE — 99283 EMERGENCY DEPT VISIT LOW MDM: CPT

## 2018-12-22 RX ORDER — NAPROXEN 500 MG/1
500 TABLET ORAL 2 TIMES DAILY WITH MEALS
Qty: 30 TABLET | Refills: 0 | Status: SHIPPED | OUTPATIENT
Start: 2018-12-22 | End: 2019-01-14

## 2018-12-23 NOTE — DISCHARGE INSTRUCTIONS
Artralgia   LO QUE NECESITA SABER:   La artralgia es dolor en idris o más articulaciones, freddie sin inflamación  El dolor podría ser de corta duración y mejorar dentro de 6 a 8 semanas  La artralgia puede ser idris señal temprana de artritis  La artralgia puede ser causada por idris condición médica sade un trastorno hormonal o un tumor  Además podría ser la consecuencia de idris infección o [place]  INSTRUCCIONES SOBRE EL JAM HOSPITALARIA:   Medicamentos:  A usted le pueden  prescribir los siguientes medicamentos:  · El acetaminofén  olga el dolor  Pregunte la cantidad y la frecuencia con que debe tomarlos  [address]  El acetaminofén puede causar daño en el hígado cuando no se ruel de forma correcta  · AINEs (Analgésicos antiinflamatorios no esteroides)  disminuyen el dolor y previenen la inflamación  Consulte con cantu médico cuál medicamento es el adecuado para usted  Pregunte cuánto lindsay y cuándo  Tómelos sade se le indique  Cuando no se abisai de la [employer], los medicamentos antiinflamatorios no esteroides pueden causar sangrado estomacal y problemas renales  · La crema para el alivio del dolor  olga el dolor  [address]  · [place] fatou medicamentos sade se le haya indicado  Consulte con cantu médico si usted evangelina que cantu medicamento no le está ayudando o si presenta efectos secundarios  Infórmele si es alérgico a algún medicamento  Mantenga idris lista actualizada de los [place], las vitaminas y los productos herbales que ruel  Incluya los siguientes datos de los medicamentos: cantidad, frecuencia y motivo de administración  Traiga con usted la lista o los envases de la píldoras a fatou citas de seguimiento  Lleve la lista de los medicamentos con usted en deborah de idris emergencia  Katherine idris lisa de control con cantu médico o especialista sade se lo indicaron:  Anote fatou preguntas para que se acuerde de hacerlas gilberto fatou visitas     Cuidados personales:   · La aplicación de calor  para ayudar a reducir el dolor  Use idris compresa o envoltura caliente  Aplíquese calor de 20 a 30 minutos cada 2 horas gilberto los días que le indiquen  · Descanse  lo más posible  Evite actividades que causan [employer] articulaciones  · Aplique hielo  para ayudar a bajar la inflamación y el dolor  El hielo también puede contribuir a evitar el daño de los tejidos  Use un paquete de hielo o ponga hielo molido dentro de [employer]  Cúbrala con idris toalla y póngasela en la articulación adolorida cada hora gilberto 15 o 20 minutos o según se le haya indicado  · [place]  la articulación con idris férula o envoltura elástica según indicaciones  · Eleve  la articulación de [employer] que quede por encima del nivel de cantu corazón tan seguido sade pueda para ayudar a reducir la inflamación y el dolor  Use almohadas o cobijas dobladas para elevar la articulación de forma cómoda  · Pierda peso  si tiene sobrepeso  El peso extra puede ejercer presión sobre fatou articulaciones y causarle más dolor  Consulte con cantu médico cuánto debería pesar  También pídale que le ayude a diseñar un buen plan de pérdida de peso  · El ejercicio  con regularidad para ayudar a mejorar el movimiento de las articulaciones y disminuir el dolor  Pida más información acerca de un plan de ejercicio adecuado para usted  Los ejercicios de bajo impacto pueden ayudar a quitar algo de la presión en las articulaciones  Anika Dec de ejercicios de bajo impacto son caminar, nadar y practicar aeróbicos acuáticos  Fisioterapia:  Un fisioterapeuta le puede enseñar ejercicios para ayudarle a mejorar el movimiento y la fuerza, y para disminuir el dolor  Pregúntele a cantu médico si la fisioterapia es apropiada para usted  Comuníquese con cantu especialista o médico sí:   · Usted tiene fiebre  · Usted continúa sintiendo dolor en las articulaciones y el dolor no se le olga con calor, hielo o medicamento      · Usted tiene dolor e inflamación alrededor de la articulación  · Usted tiene preguntas o inquietudes acerca de kovacs condición o cuidado  Regrese a la layla de emergencias si:   · Usted de repente siente un dolor severo cuando mueve la articulación  · Usted tiene fiebre y escalofríos teresa  · Usted no puede  la articulación  · Usted pierde sensibilidad en el lado del cuerpo donde está la articulación adolorida  © 2017 [address] Information is for End User's use only and may not be sold, redistributed or otherwise used for commercial purposes  All illustrations and images included in CareNotes® are the copyrighted property of A D A M , Inc  or Ralph Bailon  Esta información es sólo para uso en educación  Kovacs intención no es darle un consejo médico sobre enfermedades o tratamientos  Colsulte con kovacs Adin Kumar farmacéutico antes de seguir cualquier régimen médico para saber si es seguro y efectivo para usted  Síndrome del túnel carpiano   LO QUE USTED DEBE SABER:   El síndrome de túnel carpiano (STC) es idris condición que ocurre cuando hay un aumento de la presión sobre el nervio mediano en la [address]  El nervio mediano controla los músculos y la sensación en la [place]  El uso excesivo e inflamación en los ligamentos en la lencho podría ser idris causa de la presión  INSTRUCCIONES:   Medicamentos:   · Medicamento antiinflamatorio no esteroideo (DACIA): Estos medicamentos reducen la inflamación y el dolor  [place] están disponibles sin Brandon Martin  Consulte con kovacs médico para determinar cuál medicamento y qué dosis es [place] para usted  Tómelos sade es indicado  Los DACIA pueden causar sangrado estomacal o problemas renales si no se abisai correctamente  · [place] fatou medicamentos sade se le haya indicado  Llame a kovacs proveedor de emily si piensa que kovacs medicamento no le está ayudando o tiene efectos secundarios  Infórmele si es alérgico a algún medicamento   Mogollon Goody de estella medicamentos, vitaminas, y hierbas que [address]  Incluya la cantidad que ruel, la [place], y por qué las ruel  Traiga la lista o las botellas de las píldoras a estella visitas de seguimiento  Lleve siempre consigo idris lista de estella medicamentos en deborah de emergencia  Programe idris lisa con cantu proveedor de [employer] se le haya indicado: Anote estella preguntas para que se acuerde de hacerlas gilberto estella visitas  Cómo manejar estella síntomas:   · Utilice hielo:  El hielo ayuda a reducir la inflamación y el dolor en cantu lencho  El hielo también podría ayudar a prevenir daño al tejido  Utilice idris compresa de hielo o ponga hielo paige en idris bolsa de plástico  Cubra la compresa de hielo con idris toalla y colóquela sobre cantu lencho por 15 a 20 minutos cada hora  · Huong Mami y ocupacional:  Un terapeuta físico le demostrará maneras de realizar ejercicios y fortalecer cantu lencho  Un terapeuta ocupacional le demostrará maneras seguras de usar cantu lencho mientras realiza estella [place]  · Descanse estella jourdan:  Permita que estella jourdan descansen por un tiempo cuando hace movimientos repetitivos sade la mecanografía  Suspenda lo que está haciendo cuando usted sienta dolor en cantu lencho y suavemente realice un masaje en cantu lencho y [place]  · Use idris férula para la lencho: Esta mantiene cantu lencho recta o en idris posición ligeramente flexionada  Idris férula para la lencho reduce la presión en el nervio mediano y de esta manera descansa la [place] [address]  Es probable que usted necesite utilizar idris férula por un sharlene de 8 semanas  Podría ser necesario usar la férula para la lencho en la noche  · Evalúe estella hábitos laborales: Pida información acerca de maneras que usted puede modificar cantu trabajo para ayudar a reducir estella síntomas  Comuníquese con cantu médico de cabecera si:   · Estella síntomas empeoran  · Usted tiene preguntas o inquietudes acerca de cantu condición o cuidado    Regrese a la layla de emergencia si: · Repentinamente usted pierde la sensibilidad en kovacs mano o dedos y no puede moverlos  · Repentinamente kovacs mano cambia de color  © 2014 [address] is for End User's use only and may not be sold, redistributed or otherwise used for commercial purposes  All illustrations and images included in CareNotes® are the copyrighted property of A D A M , Inc  or Ralph Bailon  Esta información es sólo para uso en educación  Kovacs intención no es darle un consejo médico sobre enfermedades o tratamientos  Colsulte con kovacs Marlin Poncho farmacéutico antes de seguir cualquier régimen médico para saber si es seguro y efectivo para usted  Síndrome del túnel carpiano   LO QUE USTED DEBE SABER:   El síndrome de túnel carpiano (STC) es idris condición que ocurre cuando hay un aumento de la presión sobre el nervio mediano en la [address]  El nervio mediano controla los músculos y la sensación en la [place]  El uso excesivo e inflamación en los ligamentos en la lencho podría ser idris causa de la presión  DESPUÉS DE SER DADO DE JAM:   Medicamentos:   · Medicamento antiinflamatorio no esteroideo (DACIA):  Estos medicamentos reducen la inflamación y el dolor  Los [place] están disponibles sin [place] Warner Martin  Consulte con kovacs médico para determinar cuál medicamento y qué dosis es [place] para usted  Tómelos sade es indicado  Los DACIA pueden causar sangrado estomacal o problemas renales si no se abisai correctamente  · [place] fatou medicamentos sade se le haya indicado  Llame a kovacs proveedor de emily si usted piensa que kovacs medicamento no le está ayudando o si tiene efectos secundarios  Infórmele si es alérgico a cualquier medicamento  Mantenga idris lista vigente de los medicamentos, vitaminas, y hierbas que ruel  [address] cantidades, la frecuencia con que los ruel y por qué los ruel  Traiga la lista o los envases de fatou medicamentos a las citas de seguimiento   Mantenga la lista consigo en deborah de [place] emergencia  Bote las listas [place]  Programe idris lisa con cantu proveedor de [employer] se le haya indicado: Anote estella preguntas para que se acuerde de hacerlas gilberto estella visitas  Cómo manejar estella síntomas:   · Utilice hielo:  El hielo ayuda a reducir la inflamación y el dolor en canut lencho  El hielo también podría ayudar a prevenir daño al tejido  Utilice idris compresa de hielo o ponga hielo paige en idris bolsa de plástico  Cubra la compresa de hielo con idris toalla y colóquela sobre cantu lencho por 15 a 20 minutos cada hora  · Descanse estella jourdan:  Permita que estella jourdan descansen por un tiempo cuando hace movimientos repetitivos sade la mecanografía  Suspenda lo que está haciendo cuando usted sienta dolor en cantu lencho y suavemente realice un masaje en cantu lencho y [place]  · Relda Lame y ocupacional:  Un terapeuta físico le demostrará maneras de realizar ejercicios y fortalecer cantu lencho  Un terapeuta ocupacional le demostrará maneras seguras de usar cantu lencho mientras realiza estella [place]  · Use idris férula para la lencho:  Esta mantiene cantu lencho recta o en idris posición ligeramente flexionada  Idris férula para la lencho reduce la presión en el nervio mediano y de esta manera descansa la [address]  Es probable que usted necesite utilizar idris férula por un sharlene de 8 semanas  Podría ser necesario usar la férula para la lencho en la noche  · Evalúe estella hábitos laborales:  Pida información acerca de maneras que usted puede modificar cantu trabajo para ayudar a reducir estella síntomas  Comuníquese con cantu médico de cabecera si:   · Estella síntomas empeoran  · Usted tiene preguntas o inquietudes acerca de cantu condición o cuidado  Consiga atención médica inmediatamente o llame al 911 si:   · Repentinamente usted pierde la sensibilidad en cantu mano o dedos y no puede moverlos  · Repentinamente cantu mano cambia de color    © 2014 [address] is for End User's use only and may not be sold, redistributed or otherwise used for commercial purposes  All illustrations and images included in CareNotes® are the copyrighted property of A D A M , Inc  or Ralph Bailon  Esta información es sólo para uso en educación  Cantu intención no es darle un consejo médico sobre enfermedades o tratamientos  Colsulte con cantu [place] farmacéutico antes de seguir cualquier régimen médico para saber si es seguro y efectivo para usted

## 2018-12-23 NOTE — ED PROVIDER NOTES
History  Chief Complaint   Patient presents with    Arm Pain     pt  reports 5 month history of right arm pain/ghislaine  hand pains     79-year-old female presents with complaint of bilateral wrist pain  She reports that one year ago in March she began working as a  for [employer]  Approximately five months ago she began experiencing bilateral wrist and hand pain with paresthesias  She states that she has been seen by her physician for this but was not told what it could be and she has not yet gone to therapy for this  She denies any weakness or other complaints at this point time  She has not had any fever, chest pain, shortness of breath, trauma, or other acute issues  Arm Pain   Location:  Bilateral wrists/hands  Severity:  Moderate  Onset quality:  Gradual  Duration:  5 months  Timing:  Intermittent  Progression:  Waxing and waning  Chronicity:  Recurrent  Associated symptoms: no abdominal pain, no chest pain, no congestion, no cough, no diarrhea, no ear pain, no fatigue, no fever, no headaches, no loss of consciousness, no myalgias, no nausea, no rash, no rhinorrhea, no shortness of breath, no sore throat, no vomiting and no wheezing        None       Past Medical History:   Diagnosis Date    Hyperlipemia        Past Surgical History:   Procedure Laterality Date    TUBAL LIGATION         Family History   Problem Relation Age of Onset    Hyperlipidemia Mother     Hypertension Mother     Diabetes Family      I have reviewed and agree with the history as documented  Social History   Substance Use Topics    Smoking status: Former Smoker     Quit date: 4/18/2018    Smokeless tobacco: Never Used      Comment: pt  reports she quit 5 months ago    Alcohol use Yes      Comment: Occasional        Review of Systems   Constitutional: Negative for appetite change, chills, fatigue and fever     HENT: Negative for congestion, ear pain, postnasal drip, rhinorrhea, sinus pain, sore throat and trouble swallowing  Eyes: Negative for redness and itching  Respiratory: Negative for cough, chest tightness, shortness of breath and wheezing  Cardiovascular: Negative for chest pain and leg swelling  Gastrointestinal: Negative for abdominal pain, constipation, diarrhea, nausea and vomiting  Endocrine: Negative  Genitourinary: Negative for difficulty urinating and dysuria  Musculoskeletal: Negative for back pain, joint swelling, myalgias, neck pain and neck stiffness  Skin: Negative for rash  Allergic/Immunologic: Negative  Neurological: Negative for dizziness, loss of consciousness, numbness and headaches  Hematological: Negative  Psychiatric/Behavioral: Negative  Physical Exam  Physical Exam   Constitutional: She is oriented to person, place, and time  She appears well-developed and well-nourished  HENT:   Head: Normocephalic and atraumatic  Nose: Nose normal    Mouth/Throat: Oropharynx is clear and moist    Eyes: Pupils are equal, round, and reactive to light  Conjunctivae and EOM are normal    Neck: Normal range of motion  Neck supple  Cardiovascular: Normal rate, regular rhythm and normal heart sounds  Pulmonary/Chest: Effort normal and breath sounds normal  No respiratory distress  She has no wheezes  Abdominal: Soft  Bowel sounds are normal  There is no tenderness  There is no guarding  Musculoskeletal: She exhibits no edema, tenderness or deformity  Neurological: She is alert and oriented to person, place, and time  She displays normal reflexes  A sensory deficit (Bilateral hands with tingling-resolved at this point time) is present  No cranial nerve deficit  She exhibits normal muscle tone  Coordination normal    Skin: Skin is warm and dry  Capillary refill takes less than 2 seconds  No rash noted  Psychiatric: She has a normal mood and affect  Her behavior is normal    Nursing note and vitals reviewed        Vital Signs  ED Triage Vitals [12/22/18 2027] Temperature Pulse Respirations Blood Pressure SpO2   (!) 96 9 °F (36 1 °C) 103 18 147/58 99 %      Temp Source Heart Rate Source Patient Position - Orthostatic VS BP Location FiO2 (%)   Tympanic -- Sitting Right arm --      Pain Score       9           Vitals:    12/22/18 2027   BP: 147/58   Pulse: 103   Patient Position - Orthostatic VS: Sitting       Visual Acuity      ED Medications  Medications - No data to display    Diagnostic Studies  Results Reviewed     None                 No orders to display              Procedures  Procedures       Phone Contacts  ED Phone Contact    ED Course                               MDM  Number of Diagnoses or Management Options  Bilateral wrist pain:   Carpal tunnel syndrome:   Diagnosis management comments: 66-year-old female presents with bilateral wrist and hand pain  She has had fluctuating symptoms with passive months  She has tenderness upon palpation of the carpal tunnel both extremities  She is neurovascularly intact at this point time  Symptoms are worse throughout the day and as she moved and stretches she can have brief improvement of her symptoms  Tinel and Phalen's maneuvers both were positive for this patient  Discussed supportive care measures along with the need to follow up with physical therapy as instructed  She will be seeing her family physician in follow-up  She has been provided with Velcro wrist splints for both extremities along with a prescription for an anti-inflammatory      CritCare Time    Disposition  Final diagnoses:   Bilateral wrist pain   Carpal tunnel syndrome     Time reflects when diagnosis was documented in both MDM as applicable and the Disposition within this note     Time User Action Codes Description Comment    12/22/2018  9:09 PM James Salazar Add [W03 [id number],  M25 [id number]] Bilateral wrist pain     12/22/2018  9:09 PM James Salazar Add [G56 [id number]] Carpal tunnel syndrome       ED Disposition     ED Disposition Condition Comment Discharge  Daniel Arriaza discharge to home/self care  Condition at discharge: Good        Follow-up Information     Follow up With Specialties Details Why [address], [place] [facility] Medicine Call  [address] [facility]  [address] [facility]            Patient's Medications   Discharge Prescriptions    NAPROXEN (NAPROSYN) 500 MG TABLET    Take 1 tablet (500 mg total) by mouth 2 (two) times a day with meals       Start Date: 12/22/2018End Date: --       Order Dose: 500 mg       Quantity: 30 tablet    Refills: 0     No discharge procedures on file      ED Provider  Electronically Signed by           Herbie Lewis DO  12/22/18 6648

## 2018-12-26 ENCOUNTER — TELEPHONE (OUTPATIENT)
Dept: OBGYN CLINIC | Facility: CLINIC | Age: 38
End: 2018-12-26

## 2019-01-14 ENCOUNTER — OFFICE VISIT (OUTPATIENT)
Dept: FAMILY MEDICINE CLINIC | Facility: CLINIC | Age: 39
End: 2019-01-14

## 2019-01-14 VITALS
HEART RATE: 72 BPM | HEIGHT: 66 IN | TEMPERATURE: 97.6 F | RESPIRATION RATE: 18 BRPM | SYSTOLIC BLOOD PRESSURE: 136 MMHG | WEIGHT: 196 LBS | DIASTOLIC BLOOD PRESSURE: 84 MMHG | BODY MASS INDEX: 31.5 KG/M2 | OXYGEN SATURATION: 97 %

## 2019-01-14 DIAGNOSIS — M79.641 BILATERAL HAND PAIN: ICD-10-CM

## 2019-01-14 DIAGNOSIS — E78.5 HYPERLIPIDEMIA, UNSPECIFIED HYPERLIPIDEMIA TYPE: ICD-10-CM

## 2019-01-14 DIAGNOSIS — G56.03 BILATERAL CARPAL TUNNEL SYNDROME: Primary | ICD-10-CM

## 2019-01-14 DIAGNOSIS — M79.642 BILATERAL HAND PAIN: ICD-10-CM

## 2019-01-14 DIAGNOSIS — R35.0 URINARY FREQUENCY: ICD-10-CM

## 2019-01-14 DIAGNOSIS — K59.09 CHRONIC CONSTIPATION: ICD-10-CM

## 2019-01-14 DIAGNOSIS — Z11.3 SCREEN FOR STD (SEXUALLY TRANSMITTED DISEASE): ICD-10-CM

## 2019-01-14 LAB
SL AMB  POCT GLUCOSE, UA: NEGATIVE
SL AMB LEUKOCYTE ESTERASE,UA: NEGATIVE
SL AMB POCT BILIRUBIN,UA: NEGATIVE
SL AMB POCT BLOOD,UA: NEGATIVE
SL AMB POCT CLARITY,UA: CLEAR
SL AMB POCT COLOR,UA: YELLOW
SL AMB POCT KETONES,UA: NEGATIVE
SL AMB POCT NITRITE,UA: NEGATIVE
SL AMB POCT PH,UA: 5
SL AMB POCT SPECIFIC GRAVITY,UA: 1.01
SL AMB POCT URINE PROTEIN: NEGATIVE
SL AMB POCT UROBILINOGEN: 0.2

## 2019-01-14 PROCEDURE — 81002 URINALYSIS NONAUTO W/O SCOPE: CPT | Performed by: PHYSICIAN ASSISTANT

## 2019-01-14 PROCEDURE — 99214 OFFICE O/P EST MOD 30 MIN: CPT | Performed by: PHYSICIAN ASSISTANT

## 2019-01-14 RX ORDER — POLYETHYLENE GLYCOL 3350 17 G/17G
17 POWDER, FOR SOLUTION ORAL DAILY
Qty: 578 G | Refills: 0 | Status: SHIPPED | OUTPATIENT
Start: 2019-01-14 | End: 2020-02-03

## 2019-01-14 NOTE — PROGRESS NOTES
Assessment/Plan:    Hyperlipidemia  Will check lipids now    Bilateral hand pain  Continue with night braces  Will schedule consult with orthopedics continue with advil PRN    Chronic constipation  Will start on miralax  Recommend increase in fruits/veggies, water         Problem List Items Addressed This Visit        Digestive    Chronic constipation     Will start on miralax  Recommend increase in fruits/veggies, water         Relevant Medications    polyethylene glycol (GLYCOLAX) powder    Other Relevant Orders    CBC and differential       Other    Hyperlipidemia     Will check lipids now         Relevant Orders    Comprehensive metabolic panel    Lipid panel    CBC and differential    Bilateral hand pain     Continue with night braces  Will schedule consult with orthopedics continue with advil PRN           Other Visit Diagnoses     Bilateral carpal tunnel syndrome    -  Primary    Relevant Orders    Ambulatory referral to Orthopedic Surgery    Screen for STD (sexually transmitted disease)        Relevant Orders    HIV 1/2 AG-AB combo    RPR    Chronic Hepatitis Panel    Chlamydia/GC amplified DNA by PCR    Urinary frequency        Relevant Orders    POCT urine dip (Completed)            Subjective:      Patient ID: Ramona Juarez is a 45 y o  female  HPI 46 y/o F here for follow up of bilateral wrist pain  This began about 6 months ago  She was seen here on7/18 for this and recommended OT  She is having paresthesias of the hands  She was given wrist splints in ED on 12/22 and rx naproxen  The naproxen gave her GI upset so she stopped it lucas she did wear night splints  She still does get cramping in the hands  Her hands are a little bit better than in the past       She also has questions about pap smear  The testing was negative for abnormal cells 2 years in a row but positive for HPV  She has been getting pelvic pain for last month  It is now daily  It can be either side of pelvis or middle  No problems with her periods  She has been having urinary frequency also  Also c/o histoyry of constipation  Can have BM 1 time a week  No meds used  The following portions of the patient's history were reviewed and updated as appropriate:   She  has a past medical history of Hyperlipemia  She   Patient Active Problem List    Diagnosis Date Noted    Chronic constipation 01/14/2019    Cervical high risk HPV (human papillomavirus) test positive 12/11/2018    Encounter for annual routine gynecological examination 12/05/2018    Bilateral hand pain 07/17/2018    Pain in both knees 07/17/2018    Bilateral low back pain without sciatica 07/17/2018    Hyperlipidemia 10/27/2017     She  has a past surgical history that includes Tubal ligation  Her family history includes Diabetes in her family; Hyperlipidemia in her mother; Hypertension in her mother  She  reports that she quit smoking about 8 months ago  She has never used smokeless tobacco  She reports that she drinks alcohol  She reports that she does not use drugs  Current Outpatient Prescriptions   Medication Sig Dispense Refill    polyethylene glycol (GLYCOLAX) powder Take 17 g by mouth daily 578 g 0     No current facility-administered medications for this visit  Current Outpatient Prescriptions on File Prior to Visit   Medication Sig    [DISCONTINUED] naproxen (NAPROSYN) 500 mg tablet Take 1 tablet (500 mg total) by mouth 2 (two) times a day with meals     No current facility-administered medications on file prior to visit  She has No Known Allergies       Review of Systems      Objective:      /84 (BP Location: Right arm, Patient Position: Sitting, Cuff Size: Standard)   Pulse 72   Temp 97 6 °F (36 4 °C) (Tympanic)   Resp 18   Ht 5' 6" (1 676 m)   Wt 88 9 kg (196 lb)   LMP 01/05/2019 (Exact Date)   SpO2 97%   BMI 31 64 kg/m²          Physical Exam   Constitutional: She is oriented to person, place, and time   She appears well-developed and well-nourished  HENT:   Head: Normocephalic and atraumatic  Right Ear: External ear normal    Left Ear: External ear normal    Nose: Nose normal    Mouth/Throat: Oropharynx is clear and moist    Eyes: Conjunctivae are normal    Neck: Normal range of motion  Neck supple  No thyromegaly present  Cardiovascular: Normal rate, regular rhythm and normal heart sounds  No murmur heard  Pulmonary/Chest: Effort normal and breath sounds normal  No respiratory distress  Abdominal: Soft  Bowel sounds are normal  She exhibits no mass  There is no tenderness  There is no guarding  Musculoskeletal: Normal range of motion  Lymphadenopathy:     She has no cervical adenopathy  Neurological: She is alert and oriented to person, place, and time  Skin: Skin is warm  Psychiatric: She has a normal mood and affect  Her behavior is normal    Nursing note and vitals reviewed

## 2019-01-14 NOTE — PATIENT INSTRUCTIONS
Estreñimiento   CUIDADO AMBULATORIO:   Estreñimiento  es cuando usted tiene evacuaciones intestinales duras y secas o pasa más tiempo de lo normal entre cada evacuación intestinal  El estreñimiento podría ser provocado por falta de agua o alimentos altos en fibra  Los medicamentos que se usan para tratar el dolor o la depresión, o la falta de actividad física también podrían provocar el estreñimiento  Los síntomas más comunes incluyen los siguientes:   · Dificultad para tener fatou evacuaciones intestinales    · Dolor o sangrado gilberto las evacuaciones intestinales    · Sensación de que usted no terminó con cantu evacuación intestinal    · Náuseas    · Distensión abdominal    · Dolor de rodger  Busque atención médica inmediata para los siguientes síntomas:   · Jacques en las evacuaciones intestinales    · Alissa Purpura y dolor abdominal con el estreñimiento  Pregúntele a cantu médico qué vitaminas y minerales son adecuados para usted  · El estreñimiento empeora  · Usted comienza a vomitar  · Usted tiene preguntas o inquietudes acerca de cantu condición o cuidado  Medicamentos:   · Un medicamento o un suplemento de fibra  podría ayudarle a suavizar las evacuaciones intestinales  Un laxante podría aflojar o ayudar a relajar fatou intestinos para que pueda tener idris evacuación intestinal  También es probable que le den medicamentos para aumentar el líquido en fatou intestinos  El líquido puede llegar a movilizar las evacuaciones intestinales a través de fatou intestinos  · [place] fatou medicamentos sade se le haya indicado  Consulte con cantu médico si usted evangelina que cantu medicamento no le está ayudando o si presenta efectos secundarios  Infórmele si es alérgico a algún medicamento  Mantenga idris lista actualizada de los [place], las vitaminas y los productos herbales que ruel  Incluya los siguientes datos de los medicamentos: cantidad, frecuencia y motivo de administración   Pro Beatrice con usted la lista o los envases de la píldoras a fatou citas de seguimiento  Lleve la lista de los medicamentos con usted en deborah de idris emergencia  Controle o evite el estreñimiento:   · [place] líquidos sade se le haya indicado  Puede que necesite beber más líquidos para ayudar a ablandar las evacuaciones y evacuar el intestino  Pregunte cuánto líquido debe lindsay cada día y cuáles líquidos son los más adecuados para usted  · Coma alimentos altos en fibras  [place] podría ayudar a disminuir el estreñimiento al aumentar el volumen de las evacuaciones intestinales  Alimentos altos en [address] frutas, vegetales, panes y cereales integrales, y frijoles  Cantu médico o dietista puede ayudarle a crear un plan alimenticio con alto contenido de Christine  · Realice actividad física con regularidad  La actividad física regular puede ayudarle a estimular fatou intestinos  Pregunte cuáles son los ejercicios más adecuados para usted  · Programe idris hora cada día para tener idris evacuación intestinal   [place] podría ayudar a cantu cuerpo a acostumbrarse a tener evacuaciones intestinales regulares  Inclínese hacia adelante mientras está sentado en el inodoro para facilitar la expulsión de la evacuación intestinal  Siéntese en el inodoro al menos por 10 minutos, incluso si usted no tiene idris evacuación intestinal   Acuda a fatou consultas de control con cantu médico según le indicaron  Anote fatou preguntas para que se acuerde de hacerlas gilberto fatou visitas  © 2017 [employer] Information is for End User's use only and may not be sold, redistributed or otherwise used for commercial purposes  All illustrations and images included in CareNotes® are the copyrighted property of A D A M , Inc  or Ralph Bailon  Esta información es sólo para uso en educación  Cantu intención no es darle un consejo médico sobre enfermedades o tratamientos   Colsulte con cantu [place] Lesser farmacéutico antes de seguir cualquier régimen médico para saber si es seguro y efectivo para usted

## 2019-01-15 ENCOUNTER — LAB (OUTPATIENT)
Dept: LAB | Facility: CLINIC | Age: 39
End: 2019-01-15
Payer: COMMERCIAL

## 2019-01-15 ENCOUNTER — TRANSCRIBE ORDERS (OUTPATIENT)
Dept: LAB | Facility: CLINIC | Age: 39
End: 2019-01-15

## 2019-01-15 DIAGNOSIS — E78.5 HYPERLIPIDEMIA, UNSPECIFIED HYPERLIPIDEMIA TYPE: ICD-10-CM

## 2019-01-15 DIAGNOSIS — Z11.3 SCREEN FOR STD (SEXUALLY TRANSMITTED DISEASE): ICD-10-CM

## 2019-01-15 DIAGNOSIS — K59.09 CHRONIC CONSTIPATION: ICD-10-CM

## 2019-01-15 LAB
ALBUMIN SERPL BCP-MCNC: 3.6 G/DL (ref 3.5–5)
ALP SERPL-CCNC: 61 U/L (ref 46–116)
ALT SERPL W P-5'-P-CCNC: 20 U/L (ref 12–78)
ANION GAP SERPL CALCULATED.3IONS-SCNC: 6 MMOL/L (ref 4–13)
AST SERPL W P-5'-P-CCNC: 18 U/L (ref 5–45)
BASOPHILS # BLD AUTO: 0.05 THOUSANDS/ΜL (ref 0–0.1)
BASOPHILS NFR BLD AUTO: 1 % (ref 0–1)
BILIRUB SERPL-MCNC: 0.39 MG/DL (ref 0.2–1)
BUN SERPL-MCNC: 9 MG/DL (ref 5–25)
CALCIUM SERPL-MCNC: 8.4 MG/DL (ref 8.3–10.1)
CHLORIDE SERPL-SCNC: 103 MMOL/L (ref 100–108)
CHOLEST SERPL-MCNC: 206 MG/DL (ref 50–200)
CO2 SERPL-SCNC: 26 MMOL/L (ref 21–32)
CREAT SERPL-MCNC: 0.72 MG/DL (ref 0.6–1.3)
EOSINOPHIL # BLD AUTO: 0.11 THOUSAND/ΜL (ref 0–0.61)
EOSINOPHIL NFR BLD AUTO: 2 % (ref 0–6)
ERYTHROCYTE [DISTWIDTH] IN BLOOD BY AUTOMATED COUNT: 13.6 % (ref 11.6–15.1)
GFR SERPL CREATININE-BSD FRML MDRD: 107 ML/MIN/1.73SQ M
GLUCOSE P FAST SERPL-MCNC: 97 MG/DL (ref 65–99)
HCT VFR BLD AUTO: 38.5 % (ref 34.8–46.1)
HDLC SERPL-MCNC: 49 MG/DL (ref 40–60)
HGB BLD-MCNC: 11.8 G/DL (ref 11.5–15.4)
IMM GRANULOCYTES # BLD AUTO: 0.01 THOUSAND/UL (ref 0–0.2)
IMM GRANULOCYTES NFR BLD AUTO: 0 % (ref 0–2)
LDLC SERPL CALC-MCNC: 107 MG/DL (ref 0–100)
LYMPHOCYTES # BLD AUTO: 1.56 THOUSANDS/ΜL (ref 0.6–4.47)
LYMPHOCYTES NFR BLD AUTO: 28 % (ref 14–44)
MCH RBC QN AUTO: 27 PG (ref 26.8–34.3)
MCHC RBC AUTO-ENTMCNC: 30.6 G/DL (ref 31.4–37.4)
MCV RBC AUTO: 88 FL (ref 82–98)
MONOCYTES # BLD AUTO: 0.35 THOUSAND/ΜL (ref 0.17–1.22)
MONOCYTES NFR BLD AUTO: 6 % (ref 4–12)
NEUTROPHILS # BLD AUTO: 3.54 THOUSANDS/ΜL (ref 1.85–7.62)
NEUTS SEG NFR BLD AUTO: 63 % (ref 43–75)
NONHDLC SERPL-MCNC: 157 MG/DL
NRBC BLD AUTO-RTO: 0 /100 WBCS
PLATELET # BLD AUTO: 209 THOUSANDS/UL (ref 149–390)
PMV BLD AUTO: 11.1 FL (ref 8.9–12.7)
POTASSIUM SERPL-SCNC: 3.6 MMOL/L (ref 3.5–5.3)
PROT SERPL-MCNC: 7.4 G/DL (ref 6.4–8.2)
RBC # BLD AUTO: 4.37 MILLION/UL (ref 3.81–5.12)
SODIUM SERPL-SCNC: 135 MMOL/L (ref 136–145)
TRIGL SERPL-MCNC: 251 MG/DL
WBC # BLD AUTO: 5.62 THOUSAND/UL (ref 4.31–10.16)

## 2019-01-15 PROCEDURE — 80061 LIPID PANEL: CPT

## 2019-01-15 PROCEDURE — 87340 HEPATITIS B SURFACE AG IA: CPT

## 2019-01-15 PROCEDURE — 86592 SYPHILIS TEST NON-TREP QUAL: CPT

## 2019-01-15 PROCEDURE — 86704 HEP B CORE ANTIBODY TOTAL: CPT

## 2019-01-15 PROCEDURE — 36415 COLL VENOUS BLD VENIPUNCTURE: CPT

## 2019-01-15 PROCEDURE — 80053 COMPREHEN METABOLIC PANEL: CPT

## 2019-01-15 PROCEDURE — 87591 N.GONORRHOEAE DNA AMP PROB: CPT

## 2019-01-15 PROCEDURE — 87491 CHLMYD TRACH DNA AMP PROBE: CPT

## 2019-01-15 PROCEDURE — 86705 HEP B CORE ANTIBODY IGM: CPT

## 2019-01-15 PROCEDURE — 87389 HIV-1 AG W/HIV-1&-2 AB AG IA: CPT

## 2019-01-15 PROCEDURE — 85025 COMPLETE CBC W/AUTO DIFF WBC: CPT

## 2019-01-15 PROCEDURE — 86803 HEPATITIS C AB TEST: CPT

## 2019-01-16 LAB
C TRACH DNA SPEC QL NAA+PROBE: NEGATIVE
HBV CORE AB SER QL: NORMAL
HBV CORE IGM SER QL: NORMAL
HBV SURFACE AG SER QL: NORMAL
HCV AB SER QL: NORMAL
HIV 1+2 AB+HIV1 P24 AG SERPL QL IA: NORMAL
N GONORRHOEA DNA SPEC QL NAA+PROBE: NEGATIVE
RPR SER QL: NORMAL

## 2019-01-16 NOTE — PROGRESS NOTES
Labs were good except cholesterol  It is actually a little better though  LDL is 7 points over the normal and her triglycerides went down 90 points    I recommend trying to get regular exercise and eat a healthy well rounded diet

## 2019-02-12 ENCOUNTER — OFFICE VISIT (OUTPATIENT)
Dept: FAMILY MEDICINE CLINIC | Facility: CLINIC | Age: 39
End: 2019-02-12

## 2019-02-12 VITALS
HEIGHT: 66 IN | DIASTOLIC BLOOD PRESSURE: 80 MMHG | HEART RATE: 88 BPM | SYSTOLIC BLOOD PRESSURE: 122 MMHG | OXYGEN SATURATION: 98 % | RESPIRATION RATE: 18 BRPM | BODY MASS INDEX: 31.18 KG/M2 | TEMPERATURE: 97.8 F | WEIGHT: 194 LBS

## 2019-02-12 DIAGNOSIS — Z02.89 ENCOUNTER FOR PHYSICAL EXAMINATION RELATED TO EMPLOYMENT: ICD-10-CM

## 2019-02-12 DIAGNOSIS — Z11.1 ENCOUNTER FOR PPD TEST: Primary | ICD-10-CM

## 2019-02-12 PROBLEM — E66.09 CLASS 1 OBESITY DUE TO EXCESS CALORIES WITHOUT SERIOUS COMORBIDITY IN ADULT: Status: ACTIVE | Noted: 2019-02-12

## 2019-02-12 PROBLEM — E66.811 CLASS 1 OBESITY DUE TO EXCESS CALORIES WITHOUT SERIOUS COMORBIDITY IN ADULT: Status: ACTIVE | Noted: 2019-02-12

## 2019-02-12 PROCEDURE — 99213 OFFICE O/P EST LOW 20 MIN: CPT | Performed by: PHYSICIAN ASSISTANT

## 2019-02-12 PROCEDURE — 86580 TB INTRADERMAL TEST: CPT

## 2019-02-12 NOTE — ASSESSMENT & PLAN NOTE
BMI Counseling: Body mass index is 31 31 kg/m²  Discussed the patient's BMI with her  The BMI is above average  BMI counseling and education was provided to the patient  Nutrition recommendations include 3-5 servings of fruits/vegetables daily, moderation in carbohydrate intake and increasing intake of lean protein  Exercise recommendations include moderate aerobic physical activity for 150 minutes/week

## 2019-02-12 NOTE — PROGRESS NOTES
Assessment/Plan:    Class 1 obesity due to excess calories without serious comorbidity in adult  BMI Counseling: Body mass index is 31 31 kg/m²  Discussed the patient's BMI with her  The BMI is above average  BMI counseling and education was provided to the patient  Nutrition recommendations include 3-5 servings of fruits/vegetables daily, moderation in carbohydrate intake and increasing intake of lean protein  Exercise recommendations include moderate aerobic physical activity for 150 minutes/week  Problem List Items Addressed This Visit     None      Visit Diagnoses     Encounter for PPD test    -  Primary    Relevant Orders    TB Skin Test (Completed)    Encounter for physical examination related to employment            Cleared for work  Subjective:      Patient ID: Helena Verma is a 45 y o  female  HPI 46 y/o F here for physical for work  She needs 2 step PPD placed  She is still getting constipation but afraid to try miralax due to work  She also still continues to get right wrist pain  She was recommended to schedule with ortho for CTS and has rx for carpal tunnel brace    Her diet is ok but not too healthy and she is not getting any regular exercise  The following portions of the patient's history were reviewed and updated as appropriate:   She  has a past medical history of Hyperlipemia  She   Patient Active Problem List    Diagnosis Date Noted    Class 1 obesity due to excess calories without serious comorbidity in adult 02/12/2019    Chronic constipation 01/14/2019    Cervical high risk HPV (human papillomavirus) test positive 12/11/2018    Encounter for annual routine gynecological examination 12/05/2018    Bilateral hand pain 07/17/2018    Pain in both knees 07/17/2018    Bilateral low back pain without sciatica 07/17/2018    Hyperlipidemia 10/27/2017     She  has a past surgical history that includes Tubal ligation    Her family history includes Diabetes in her family; Hyperlipidemia in her mother; Hypertension in her mother  She  reports that she quit smoking about 9 months ago  She has never used smokeless tobacco  She reports that she drinks alcohol  She reports that she does not use drugs  Current Outpatient Medications   Medication Sig Dispense Refill    polyethylene glycol (GLYCOLAX) powder Take 17 g by mouth daily 578 g 0     No current facility-administered medications for this visit  Current Outpatient Medications on File Prior to Visit   Medication Sig    polyethylene glycol (GLYCOLAX) powder Take 17 g by mouth daily     No current facility-administered medications on file prior to visit  She has No Known Allergies       Review of Systems   Constitutional: Negative for activity change, appetite change, chills, fatigue and unexpected weight change  HENT: Negative for dental problem, ear pain, hearing loss and sore throat  Eyes: Negative for visual disturbance  Respiratory: Negative for cough and wheezing  Cardiovascular: Negative for chest pain  Gastrointestinal: Positive for constipation  Negative for abdominal pain, diarrhea and vomiting  Genitourinary: Negative for difficulty urinating and dysuria  Musculoskeletal: Positive for joint swelling (wrists only sometimes  )  Negative for arthralgias, back pain and myalgias  Skin: Negative for rash  Neurological: Negative for dizziness and headaches  Psychiatric/Behavioral: Negative for behavioral problems  Objective:      /80 (BP Location: Right arm, Patient Position: Sitting, Cuff Size: Large)   Pulse 88   Temp 97 8 °F (36 6 °C) (Tympanic)   Resp 18   Ht 5' 6" (1 676 m)   Wt 88 kg (194 lb)   LMP 01/28/2019 (Exact Date)   SpO2 98%   BMI 31 31 kg/m²          Physical Exam   Constitutional: She is oriented to person, place, and time  She appears well-developed and well-nourished  HENT:   Head: Normocephalic and atraumatic     Right Ear: External ear normal  Left Ear: External ear normal    Nose: Nose normal    Mouth/Throat: Oropharynx is clear and moist    Eyes: Conjunctivae are normal    Neck: Normal range of motion  Neck supple  No thyromegaly present  Cardiovascular: Normal rate, regular rhythm and normal heart sounds  No murmur heard  Pulmonary/Chest: Effort normal and breath sounds normal  No respiratory distress  Abdominal: Soft  Bowel sounds are normal  She exhibits no mass  There is no tenderness  There is no guarding  Musculoskeletal: Normal range of motion  She exhibits tenderness (mild right wrist tenderness)  Lymphadenopathy:     She has no cervical adenopathy  Neurological: She is alert and oriented to person, place, and time  Skin: Skin is warm  Psychiatric: She has a normal mood and affect  Her behavior is normal    Nursing note and vitals reviewed

## 2019-02-14 ENCOUNTER — CLINICAL SUPPORT (OUTPATIENT)
Dept: FAMILY MEDICINE CLINIC | Facility: CLINIC | Age: 39
End: 2019-02-14

## 2019-02-14 DIAGNOSIS — Z23 ENCOUNTER FOR IMMUNIZATION: Primary | ICD-10-CM

## 2019-02-14 LAB
INDURATION: 0 MM
TB SKIN TEST: NEGATIVE

## 2019-02-14 PROCEDURE — 90746 HEPB VACCINE 3 DOSE ADULT IM: CPT

## 2019-02-14 PROCEDURE — 90471 IMMUNIZATION ADMIN: CPT

## 2019-02-14 NOTE — PROGRESS NOTES
Pt here to get ppd read  Ppd negative @ 0mm, pt advised to come back after 10 days for second step and in 2 months for second Hep B   MD

## 2019-02-15 ENCOUNTER — OFFICE VISIT (OUTPATIENT)
Dept: OBGYN CLINIC | Facility: HOSPITAL | Age: 39
End: 2019-02-15
Payer: COMMERCIAL

## 2019-02-15 VITALS
DIASTOLIC BLOOD PRESSURE: 90 MMHG | HEART RATE: 94 BPM | SYSTOLIC BLOOD PRESSURE: 146 MMHG | WEIGHT: 189 LBS | HEIGHT: 66 IN | BODY MASS INDEX: 30.37 KG/M2

## 2019-02-15 DIAGNOSIS — G56.03 BILATERAL CARPAL TUNNEL SYNDROME: ICD-10-CM

## 2019-02-15 PROCEDURE — 99203 OFFICE O/P NEW LOW 30 MIN: CPT | Performed by: SURGERY

## 2019-02-15 NOTE — PROGRESS NOTES
Juan F VALERA  Attending, Orthopaedic Surgery  Hand, Wrist, and Elbow Surgery  [facility]      ORTHOPAEDIC HAND, WRIST, AND ELBOW OFFICE  VISIT       ASSESSMENT/PLAN:      Bilateral carpal tunnel syndrome, symptoms worse on R than L    Plan:  Obtain EMG of bilateral UE  Follow-up in clinic to review results and evaluate severity of disease  Use night splints if improve symptoms on a nightly basis rather than inconsistently if they improve symptoms    The patient verbalized understanding of exam findings and treatment plan  We engaged in the shared decision-making process and treatment options were discussed at length with the patient  Surgical and conservative management discussed today along with risks and benefits  Diagnoses and all orders for this visit:    Bilateral carpal tunnel syndrome  -     Ambulatory referral to Orthopedic Surgery        Follow Up:  No follow-ups on file  To Do Next Visit:  Review EMG results      General Discussions:  Carpal Tunnel Syndrome: The anatomy and physiology of carpal tunnel syndrome was discussed with the patient today  Increase pressure localized under the transverse carpal ligament can cause pain, numbness, tingling, or dysesthesias within the median nerve distribution as well as feelings of fatigue, clumsiness, or awkwardness  These symptoms typically occur at night and worse in the morning upon waking  Eventually, untreated carpal tunnel syndrome can result in weakness and permanent loss of muscle within the thenar compartment of the hand  Treatment options were discussed with the patient  Conservative treatment includes nocturnal resting splints to keep the nerve in a neutral position, ergonomic changes within the work or home environment, activity modification, and tendon gliding exercises  Vitamin B6 one tablet daily over the counter may helpful to reduce symptoms     Steroid injections within the carpal canal can help a majority of patients, however this is often self-limited in a majority of patients  Surgical intervention to divide the transverse carpal ligament typically results in a long-lasting relief of the patient's complaints, with the recurrence rate of less than 1%                                                                                                                                                    ____________________________________________________________________________________________________________________________________________      CHIEF COMPLAINT:  Chief Complaint   Patient presents with    Right Hand - Pain    Left Hand - Pain       SUBJECTIVE:  Ever Pang is a 45y o  year old  female who presents with 4-5 month symptoms of bilateral hand numbness in the radial digits which wakes her up from sleep  She has tried bracing which does not completely relieve the symptoms  Her symptoms come and go        Pain/symptom timing:  Worse during the day when active  Pain/symptom context:  Worse with activites and work  Pain/symptom modifying factors:  Rest makes better, activities make worse  Pain/symptom associated signs/symptoms: none    I have personally reviewed all the relevant PMH, PSH, SH, FH, Medications and allergies      PAST MEDICAL HISTORY:  Past Medical History:   Diagnosis Date    Hyperlipemia        PAST SURGICAL HISTORY:  Past Surgical History:   Procedure Laterality Date    TUBAL LIGATION         FAMILY HISTORY:  Family History   Problem Relation Age of Onset    Hyperlipidemia Mother     Hypertension Mother     Diabetes Family        SOCIAL HISTORY:  Social History     Tobacco Use    Smoking status: Former Smoker     Last attempt to quit: 2018     Years since quittin 8    Smokeless tobacco: Never Used    Tobacco comment: pt  reports she quit 5 months ago   Substance Use Topics    Alcohol use: Yes     Comment: Occasional    Drug use: No       MEDICATIONS:    Current Outpatient Medications:     polyethylene glycol (GLYCOLAX) powder, Take 17 g by mouth daily, Disp: 578 g, Rfl: 0    ALLERGIES:  No Known Allergies        REVIEW OF SYSTEMS:  Review of Systems   Constitutional: Negative  Respiratory: Negative  Cardiovascular: Negative  Gastrointestinal: Negative  Psychiatric/Behavioral: Negative  VITALS:  Vitals:    02/15/19 1526   BP: 146/90   Pulse: 94       LABS:  HgA1c: No results found for: HGBA1C  BMP:   Lab Results   Component Value Date    CALCIUM 8 4 01/15/2019    K 3 6 01/15/2019    CO2 26 01/15/2019     01/15/2019    BUN 9 01/15/2019    CREATININE 0 72 01/15/2019       _____________________________________________________  PHYSICAL EXAMINATION:  General: well developed and well nourished, alert, oriented times 3 and appears comfortable  Psychiatric: Normal  HEENT: Normocephalic, Atraumatic Trachea Midline, No torticollis  Pulmonary: No audible wheezing or respiratory distress   Cardiovascular: No pitting edema, 2+ radial pulse   Skin: No masses, erythema, lacerations, fluctation, ulcerations  Neurovascular: Sensation Intact to the Median, Ulnar, Radial Nerve, Motor Intact to the Median, Ulnar, Radial Nerve and Pulses Intact  Musculoskeletal: Normal, except as noted in detailed exam and in HPI        MUSCULOSKELETAL EXAMINATION:  Bilateral UE:  Positive Durkan's compression test bilaterally  Bilateral hands warm/well perfused  No thenar atrophy bilaterally    ___________________________________________________  STUDIES REVIEWED:  None to review today          PROCEDURES PERFORMED:  Procedures  No Procedures performed today    _____________________________________________________      Mabel Isaac  02/15/19

## 2019-02-25 ENCOUNTER — CLINICAL SUPPORT (OUTPATIENT)
Dept: FAMILY MEDICINE CLINIC | Facility: CLINIC | Age: 39
End: 2019-02-25

## 2019-02-25 DIAGNOSIS — Z11.1 ENCOUNTER FOR PPD TEST: Primary | ICD-10-CM

## 2019-02-25 PROCEDURE — 86580 TB INTRADERMAL TEST: CPT

## 2019-02-25 NOTE — PROGRESS NOTES
Pt here for ppd placement  Ppd placed on LFA, pt to come back in 48-72 hours for reading, paper on nurse folder   MD

## 2019-02-27 ENCOUNTER — TRANSCRIBE ORDERS (OUTPATIENT)
Dept: ADMINISTRATIVE | Facility: HOSPITAL | Age: 39
End: 2019-02-27

## 2019-02-27 ENCOUNTER — CLINICAL SUPPORT (OUTPATIENT)
Dept: FAMILY MEDICINE CLINIC | Facility: CLINIC | Age: 39
End: 2019-02-27

## 2019-02-27 DIAGNOSIS — Z12.39 SCREENING BREAST EXAMINATION: Primary | ICD-10-CM

## 2019-02-27 DIAGNOSIS — Z11.1 ENCOUNTER FOR PPD SKIN TEST READING: Primary | ICD-10-CM

## 2019-02-27 LAB
INDURATION: 0 MM
TB SKIN TEST: NEGATIVE

## 2019-02-27 NOTE — PROGRESS NOTES
Pt here today for PPD reading, PPD is negative 0MM  pts form has been updated and copied and handed back to the patient   SR

## 2019-03-14 ENCOUNTER — CLINICAL SUPPORT (OUTPATIENT)
Dept: FAMILY MEDICINE CLINIC | Facility: CLINIC | Age: 39
End: 2019-03-14

## 2019-03-14 DIAGNOSIS — Z23 ENCOUNTER FOR IMMUNIZATION: Primary | ICD-10-CM

## 2019-03-14 PROCEDURE — 90746 HEPB VACCINE 3 DOSE ADULT IM: CPT

## 2019-03-14 PROCEDURE — 90471 IMMUNIZATION ADMIN: CPT

## 2019-03-21 ENCOUNTER — HOSPITAL ENCOUNTER (OUTPATIENT)
Dept: MAMMOGRAPHY | Facility: CLINIC | Age: 39
Discharge: HOME | End: 2019-03-21
Payer: COMMERCIAL

## 2019-03-21 ENCOUNTER — HOSPITAL ENCOUNTER (OUTPATIENT)
Dept: ULTRASOUND IMAGING | Facility: CLINIC | Age: 39
Discharge: HOME | End: 2019-03-21
Payer: COMMERCIAL

## 2019-03-21 ENCOUNTER — APPOINTMENT (OUTPATIENT)
Dept: ULTRASOUND IMAGING | Facility: CLINIC | Age: 39
End: 2019-03-21
Payer: COMMERCIAL

## 2019-03-21 VITALS — HEIGHT: 66 IN | BODY MASS INDEX: 31.18 KG/M2 | WEIGHT: 194 LBS

## 2019-03-21 DIAGNOSIS — N63.0 LUMP OR MASS IN BREAST: ICD-10-CM

## 2019-03-21 DIAGNOSIS — R92.8 ABNORMAL FINDINGS ON DIAGNOSTIC IMAGING OF BREAST: ICD-10-CM

## 2019-03-21 PROCEDURE — 76642 ULTRASOUND BREAST LIMITED: CPT

## 2019-03-21 PROCEDURE — G0279 TOMOSYNTHESIS, MAMMO: HCPCS

## 2019-03-21 PROCEDURE — 77066 DX MAMMO INCL CAD BI: CPT

## 2019-03-21 NOTE — RESULT ENCOUNTER NOTE
She has some noncancerous cysts in breasts but area of concern looked normal  I recommend she continue to check her breasts regularly   If she has pain or feels anything new then return to office or see gyn

## 2019-07-02 ENCOUNTER — HOSPITAL ENCOUNTER (OUTPATIENT)
Dept: NEUROLOGY | Facility: HOSPITAL | Age: 39
Discharge: HOME | End: 2019-07-02
Payer: COMMERCIAL

## 2019-07-02 DIAGNOSIS — G56.03 BILATERAL CARPAL TUNNEL SYNDROME: ICD-10-CM

## 2019-07-02 PROCEDURE — 95886 MUSC TEST DONE W/N TEST COMP: CPT | Performed by: PSYCHIATRY & NEUROLOGY

## 2019-07-02 PROCEDURE — 95913 NRV CNDJ TEST 13/> STUDIES: CPT | Performed by: PSYCHIATRY & NEUROLOGY

## 2019-07-30 ENCOUNTER — OFFICE VISIT (OUTPATIENT)
Dept: FAMILY MEDICINE CLINIC | Facility: CLINIC | Age: 39
End: 2019-07-30

## 2019-07-30 VITALS
TEMPERATURE: 97 F | BODY MASS INDEX: 32.66 KG/M2 | DIASTOLIC BLOOD PRESSURE: 74 MMHG | OXYGEN SATURATION: 98 % | SYSTOLIC BLOOD PRESSURE: 134 MMHG | WEIGHT: 203.2 LBS | RESPIRATION RATE: 18 BRPM | HEIGHT: 66 IN | HEART RATE: 111 BPM

## 2019-07-30 DIAGNOSIS — R35.0 URINARY FREQUENCY: Primary | ICD-10-CM

## 2019-07-30 DIAGNOSIS — Z83.49 FAMILY HISTORY OF HYPOTHYROIDISM: ICD-10-CM

## 2019-07-30 LAB
SL AMB  POCT GLUCOSE, UA: NEGATIVE
SL AMB LEUKOCYTE ESTERASE,UA: NEGATIVE
SL AMB POCT BILIRUBIN,UA: NEGATIVE
SL AMB POCT BLOOD,UA: NEGATIVE
SL AMB POCT CLARITY,UA: CLEAR
SL AMB POCT COLOR,UA: YELLOW
SL AMB POCT GLUCOSE BLD: 113
SL AMB POCT KETONES,UA: 5
SL AMB POCT NITRITE,UA: NEGATIVE
SL AMB POCT PH,UA: 6
SL AMB POCT SPECIFIC GRAVITY,UA: 1
SL AMB POCT URINE PROTEIN: 15
SL AMB POCT UROBILINOGEN: NEGATIVE

## 2019-07-30 PROCEDURE — 99213 OFFICE O/P EST LOW 20 MIN: CPT | Performed by: PHYSICIAN ASSISTANT

## 2019-07-30 PROCEDURE — 81002 URINALYSIS NONAUTO W/O SCOPE: CPT | Performed by: PHYSICIAN ASSISTANT

## 2019-07-30 PROCEDURE — 82948 REAGENT STRIP/BLOOD GLUCOSE: CPT | Performed by: PHYSICIAN ASSISTANT

## 2019-07-30 NOTE — PROGRESS NOTES
Assessment/Plan:    No problem-specific Assessment & Plan notes found for this encounter  Problem List Items Addressed This Visit     None      Visit Diagnoses     Urinary frequency    -  Primary    Relevant Orders    POCT urine dip (Completed)    POCT blood glucose (Completed)    US kidney and bladder    Family history of hypothyroidism        Relevant Orders    TSH, 3rd generation with Free T4 reflex        her blood sugar was normal today and so was the urinalysis  Recommend ultrasound the bladder and kidneys  Subjective:      Patient ID: Tex Christianson is a 44 y o  female  HPI 27-year-old female here for abdominal pain and urinary frequency  Urination began 2 weeks ago  NO vaginal discharge  No dysuria  LMP was 5 days  She did small yesterday  She has only had this once before  Periods typically regular without clotting  The abdominal pain she is having is in the right upper quadrant in the suprapubic area  No associated with food  No nausea or vomiting  She has she has also urinary frequency dysuria  She denies any flank pain  No fevers or chills  She does a history of has been using MiraLax  intermittently  No problems with constipation right now  The following portions of the patient's history were reviewed and updated as appropriate:   She  has a past medical history of Hyperlipemia  She   Patient Active Problem List    Diagnosis Date Noted    Paresthesias     Class 1 obesity due to excess calories without serious comorbidity in adult 02/12/2019    Chronic constipation 01/14/2019    Cervical high risk HPV (human papillomavirus) test positive 12/11/2018    Encounter for annual routine gynecological examination 12/05/2018    Bilateral hand pain 07/17/2018    Pain in both knees 07/17/2018    Bilateral low back pain without sciatica 07/17/2018    Hyperlipidemia 10/27/2017     She  has a past surgical history that includes Tubal ligation    Her family history includes Diabetes in her family; Hyperlipidemia in her mother; Hypertension in her mother  She  reports that she quit smoking about 15 months ago  She has never used smokeless tobacco  She reports that she drinks alcohol  She reports that she does not use drugs  Current Outpatient Medications   Medication Sig Dispense Refill    polyethylene glycol (GLYCOLAX) powder Take 17 g by mouth daily 578 g 0     No current facility-administered medications for this visit  Current Outpatient Medications on File Prior to Visit   Medication Sig    polyethylene glycol (GLYCOLAX) powder Take 17 g by mouth daily     No current facility-administered medications on file prior to visit  She has No Known Allergies       Review of Systems   Constitutional: Negative for activity change, chills and fever  HENT: Positive for congestion  Respiratory: Negative for cough  Cardiovascular: Negative for chest pain  Gastrointestinal: Positive for abdominal pain  Negative for constipation, diarrhea, nausea and vomiting  Genitourinary: Positive for frequency and menstrual problem  Negative for difficulty urinating, dysuria, flank pain, vaginal bleeding, vaginal discharge and vaginal pain  Objective:      /74 (BP Location: Right arm, Patient Position: Sitting, Cuff Size: Standard)   Pulse (!) 111   Temp (!) 97 °F (36 1 °C) (Temporal)   Resp 18   Ht 5' 6" (1 676 m)   Wt 92 2 kg (203 lb 3 2 oz)   SpO2 98%   Breastfeeding? No   BMI 32 80 kg/m²          Physical Exam   Constitutional: She is oriented to person, place, and time  She appears well-developed and well-nourished  HENT:   Head: Normocephalic and atraumatic  Right Ear: External ear normal    Left Ear: External ear normal    Nose: Nose normal    Mouth/Throat: Oropharynx is clear and moist    Eyes: Conjunctivae are normal    Neck: Normal range of motion  Neck supple  No thyromegaly present     Cardiovascular: Normal rate, regular rhythm and normal heart sounds  No murmur heard  Pulmonary/Chest: Effort normal and breath sounds normal  No respiratory distress  Abdominal: Soft  Bowel sounds are normal  She exhibits no mass  There is tenderness (Mild suprapubic)  There is no guarding  Musculoskeletal: Normal range of motion  Lymphadenopathy:     She has no cervical adenopathy  Neurological: She is alert and oriented to person, place, and time  Skin: Skin is warm  Psychiatric: She has a normal mood and affect  Her behavior is normal    Nursing note and vitals reviewed

## 2019-07-31 ENCOUNTER — TELEPHONE (OUTPATIENT)
Dept: FAMILY MEDICINE CLINIC | Facility: CLINIC | Age: 39
End: 2019-07-31

## 2019-07-31 NOTE — TELEPHONE ENCOUNTER
[place] PEV#[id number] gave pt all info and directions  US kidney/bladder [facility]([phone]) is on 8/14/2019 at 10 am at [address], [place]  Pt needs to drink 24-32oz of water 1 hour before at and can't use bathroom until after apt

## 2019-08-02 ENCOUNTER — LAB (OUTPATIENT)
Dept: LAB | Facility: HOSPITAL | Age: 39
End: 2019-08-02
Payer: COMMERCIAL

## 2019-08-02 ENCOUNTER — OFFICE VISIT (OUTPATIENT)
Dept: OBGYN CLINIC | Facility: HOSPITAL | Age: 39
End: 2019-08-02
Payer: COMMERCIAL

## 2019-08-02 VITALS
BODY MASS INDEX: 32.95 KG/M2 | HEART RATE: 99 BPM | DIASTOLIC BLOOD PRESSURE: 84 MMHG | WEIGHT: 205 LBS | SYSTOLIC BLOOD PRESSURE: 124 MMHG | HEIGHT: 66 IN

## 2019-08-02 DIAGNOSIS — R20.0 BILATERAL HAND NUMBNESS: Primary | ICD-10-CM

## 2019-08-02 DIAGNOSIS — Z83.49 FAMILY HISTORY OF HYPOTHYROIDISM: ICD-10-CM

## 2019-08-02 LAB — TSH SERPL DL<=0.05 MIU/L-ACNC: 2.02 UIU/ML (ref 0.36–3.74)

## 2019-08-02 PROCEDURE — 84443 ASSAY THYROID STIM HORMONE: CPT

## 2019-08-02 PROCEDURE — 99213 OFFICE O/P EST LOW 20 MIN: CPT | Performed by: SURGERY

## 2019-08-02 PROCEDURE — 36415 COLL VENOUS BLD VENIPUNCTURE: CPT

## 2019-08-02 NOTE — PROGRESS NOTES
ASSESSMENT/PLAN:      Visit was carried out using [employer] interpretor [id number]    BL carpal tunnel syndrome, EMG negative   Discussed with the patient that sometimes patients can have symptoms but it may not always show up on EMG  With negative results, however, we know that the nerve is not significantly damaged or compressed  We recommend steroid injections into the BL carpal tunnels  Patient would like to proceed with this but she works in the afternoon so she would like to reschedule for a morning appointment so she has time for the numbness to wear off  We will see her back some time in the AM for steroid injection  The patient verbalized understanding of exam findings and treatment plan  We engaged in the shared decision-making process and treatment options were discussed at length with the patient  Surgical and conservative management discussed today along with risks and benefits  Diagnoses and all orders for this visit:    Bilateral hand numbness          Follow Up:  No follow-ups on file  To Do Next Visit:  Re-evaluation of current issue    ____________________________________________________________________________________________________________________________________________      CHIEF COMPLAINT:  Chief Complaint   Patient presents with    Left Hand - Follow-up    Right Hand - Follow-up       SUBJECTIVE:  Molly Harris is a 44y o  year old RHD female who presents for follow up evaluation of BL carpal tunnel syndrome  EMG was negative for pathology however she still notes numbness and tingling in the first 3 fingers that wakes her up at night  She denies any improvement in her symptoms with bracing  I have personally reviewed all the relevant PMH, PSH, SH, FH, Medications and allergies       PAST MEDICAL HISTORY:  Past Medical History:   Diagnosis Date    Hyperlipemia        PAST SURGICAL HISTORY:  Past Surgical History:   Procedure Laterality Date    TUBAL LIGATION FAMILY HISTORY:  Family History   Problem Relation Age of Onset    Hyperlipidemia Mother     Hypertension Mother     Diabetes Family        SOCIAL HISTORY:  Social History     Tobacco Use    Smoking status: Former Smoker     Last attempt to quit: 2018     Years since quittin 2    Smokeless tobacco: Never Used    Tobacco comment: pt  reports she quit 5 months ago   Substance Use Topics    Alcohol use: Yes     Comment: Occasional    Drug use: No       MEDICATIONS:    Current Outpatient Medications:     polyethylene glycol (GLYCOLAX) powder, Take 17 g by mouth daily, Disp: 578 g, Rfl: 0    ALLERGIES:  No Known Allergies    REVIEW OF SYSTEMS:  Review of Systems   Constitutional: Negative for chills, diaphoresis, fatigue and fever  HENT: Negative for congestion, facial swelling, hearing loss, sore throat, trouble swallowing and voice change  Respiratory: Negative for apnea, cough, shortness of breath, wheezing and stridor  Cardiovascular: Negative for chest pain, palpitations and leg swelling  Musculoskeletal: Positive for myalgias  Negative for arthralgias, gait problem and joint swelling  Skin: Negative for color change, pallor, rash and wound  Neurological: Positive for numbness  Negative for tremors, speech difficulty and weakness         VITALS:  Vitals:    19 1328   BP: 124/84   Pulse: 99       LABS:  HgA1c: No results found for: HGBA1C  BMP:   Lab Results   Component Value Date    CALCIUM 8 4 01/15/2019    K 3 6 01/15/2019    CO2 26 01/15/2019     01/15/2019    BUN 9 01/15/2019    CREATININE 0 72 01/15/2019       _____________________________________________________  PHYSICAL EXAMINATION:  General: well developed and well nourished, alert, oriented times 3 and appears comfortable  Psychiatric: Normal  HEENT: Normocephalic, Atraumatic Trachea Midline, No torticollis  Pulmonary: No audible wheezing or respiratory distress   Cardiovascular: No pitting edema, 2+ radial pulse   Skin: No masses, erythema, lacerations, fluctation, ulcerations  Neurovascular: Sensation Intact to the Median, Ulnar, Radial Nerve, Motor Intact to the Median, Ulnar, Radial Nerve and Pulses Intact  Musculoskeletal: Normal, except as noted in detailed exam and in HPI  MUSCULOSKELETAL EXAMINATION:  Bilateral Carpal Tunnel Exam:    Negative thenar atrophy  Negative phalen's test  Positive carpal tunnel compression  Negative tinels over median nerve at the wrist   Opposition strength 5/5  Abduction strength 5/5         ___________________________________________________  STUDIES REVIEWED:  EMG reviewed - no evidence of carpal tunnel            PROCEDURES PERFORMED:  Procedures  No Procedures performed today    _____________________________________________________      PA Cosign: I supervised the physician assistant and discussed the case with them  I personally performed history and physical exam  I agree with the assessment and plan of care as documented by the physician assistant

## 2019-08-14 ENCOUNTER — HOSPITAL ENCOUNTER (OUTPATIENT)
Dept: ULTRASOUND IMAGING | Facility: HOSPITAL | Age: 39
Discharge: HOME | End: 2019-08-14
Payer: COMMERCIAL

## 2019-08-14 DIAGNOSIS — R35.0 URINARY FREQUENCY: ICD-10-CM

## 2019-08-14 PROCEDURE — 76770 US EXAM ABDO BACK WALL COMP: CPT

## 2019-08-15 ENCOUNTER — CLINICAL SUPPORT (OUTPATIENT)
Dept: FAMILY MEDICINE CLINIC | Facility: CLINIC | Age: 39
End: 2019-08-15

## 2019-08-15 DIAGNOSIS — Z23 NEED FOR VACCINATION: Primary | ICD-10-CM

## 2019-08-15 PROCEDURE — 90746 HEPB VACCINE 3 DOSE ADULT IM: CPT | Performed by: PHYSICIAN ASSISTANT

## 2019-08-15 PROCEDURE — 90471 IMMUNIZATION ADMIN: CPT | Performed by: PHYSICIAN ASSISTANT

## 2019-08-20 NOTE — RESULT ENCOUNTER NOTE
Bladder looks good on ultrasound  She did have what appears to be a fibroid of the uterus  This is a noncancerous growth of the uterus and sometimes it does cause abdominal pain or more cramping with periods and/or can cause abnormal menstrual cycles    I would recommend she schedule consultation with Gynecology

## 2019-08-29 ENCOUNTER — OFFICE VISIT (OUTPATIENT)
Dept: OBGYN CLINIC | Facility: CLINIC | Age: 39
End: 2019-08-29

## 2019-08-29 VITALS
DIASTOLIC BLOOD PRESSURE: 83 MMHG | SYSTOLIC BLOOD PRESSURE: 140 MMHG | HEIGHT: 66 IN | WEIGHT: 202 LBS | HEART RATE: 99 BPM | BODY MASS INDEX: 32.47 KG/M2

## 2019-08-29 DIAGNOSIS — R10.2 PELVIC PAIN: Primary | ICD-10-CM

## 2019-08-29 PROCEDURE — 99213 OFFICE O/P EST LOW 20 MIN: CPT | Performed by: OBSTETRICS & GYNECOLOGY

## 2019-08-29 RX ORDER — IBUPROFEN 600 MG/1
600 TABLET ORAL EVERY 6 HOURS PRN
Qty: 30 TABLET | Refills: 1 | Status: SHIPPED | OUTPATIENT
Start: 2019-08-29 | End: 2020-02-03

## 2019-08-29 NOTE — PATIENT INSTRUCTIONS
Miomas uterinos   LO QUE NECESITA SABER:   Los miomas uterinos son crecimientos dentro de cantu útero (vientre)  Éstos también se conocen sade tumores (protuberancias) o leiomiomas  Los miomas uterinos a menudo aparecen en grupos, o podría tener sánchez solamente  Podrían ser pequeños o grandes, y podrían crecer de [place]  Zainab siempre son benignos (no cancerosos) y no corren la probabilidad de propagarse a otras partes de cantu cuerpo  INSTRUCCIONES SOBRE EL JAM HOSPITALARIA:   Medicamentos:   · Analgésicos:  Usted podría recibir medicamento para quitarle o reducir el dolor  No espere a que el dolor sea muy intenso para lindsay el medicamento  · Hormonas:  [place] medicamento cambia el nivel de ciertas hormonas y podría ayudar a reducir cantu mioma  · Contraceptivos:  Estos medicamentos ayudan a prevenir un embarazo  También podrían ayudar a reducir fatou miomas  · [place] cantu medicamento según las indicaciones:  Llame a cantu médico si usted piensa que el medicamento no está ayudando o si tiene efectos secundarios  Infórmele si está tomando vitaminas, hierbas u otros medicamentos  Lleve idris lista de los medicamentos que ruel  Incluya los siguientes datos de los medicamentos: cantidad, frecuencia y motivo de administración  Traiga con usted la lista o los envases de la píldoras a fatou citas de seguimiento  Acuda a fatou consultas de control con cantu médico según le indicaron  Anote fatou preguntas para que se acuerde de hacerlas gilberto fatou visitas  Evite levantar objetos pesados:  Usted necesita evitar levantar objetos pesados gilberto cierto periodo de tiempo después de cantu [place] va a ayudar a prevenir lesión a la herida de la cirugía  Pregunte a cantu médico cuándo puede regresar a fatou [facility]  Evite embarazo:  Usted necesita evitar embarazarse hasta que haya recibido cantu tratamiento y se haya curado de fatou miomas  Pregúntele a cantu médico cuándo es seguro quedar embarazada    Cuidado de cantu herida:  Consulte con kovacs médico sobre el cuidado de kovacs herida  Pregúntele a kovacs Melody Clayman vitaminas y minerales son adecuados para usted  · Usted se siente débil y más cansado de lo usual      · Usted no siente que kovacs vejiga esta vacía después de mandi orinado  Usted también podría estar orinado pequeñas cantidades de [facility] más a menudo  · Usted tiene nuevos o peores sofocos  · Usted tiene [place] pregunta sobre kovacs condición o cuidado  Busque atención médica de inmediato o llame al 911 si:   · Kovacs corazón comienza a latir fuertemente, y se siente desvanecido  · Usted esta teniendo más sangrado vaginal, dolor pélvico o presión pélvica  · Usted tiene fiebre  · Kovacs pierna se siente cálida, sensible y [place]  Se podría fuad inflamado y [place]  · Usted expectora orquidea  · Usted se siente mareado, le hace falta el aire y tiene dolor de [place]  Usted expectora orquidea  · Usted se siente mareado, le hace falta el aire y tiene dolor de [place]  © 2017 [address] [employer] Information is for End User's use only and may not be sold, redistributed or otherwise used for commercial purposes  All illustrations and images included in CareNotes® are the copyrighted property of A D A M , Inc  or Ralph Bailon  Esta información es sólo para uso en educación  Kovacs intención no es darle un consejo médico sobre enfermedades o tratamientos  Colsulte con kovacs [employer] farm[employer] antes de seguir cualquier régimen médico para saber si es seguro y efectivo para usted

## 2019-08-29 NOTE — PROGRESS NOTES
ASSESMENT & PLAN:           1  Fibroid uterus   - TVUS ordered, fibroid uterus noted on prior TAUS but dimensions not present   - Motrin 600mg q6h Rx   - RTC 1 week to discuss US results   - Consideration for possible LAVH  2  RTC in 1 week   - Discuss TVUS and options    SUBJECTIVE:             Patient is a 44 y o  at [id number] who presents with abdominal pain, pressure, and urinary frequency that she states began 3 months ago  She notes pain in her RUQ and that this area is hard  She saw her PCP in July and an abdominal US was performed that was unremarkable except for an enlarged fibroid uterus  Dimensions were not taken  She denies a history of fibroids in her family  She notes her LMP was , lasts for 5d, is regular every 28d  She has no dysmenorrhea or abnormal uterine bleeding  She is s/p tubal ligation  Review of Systems   Review of Systems   Constitutional: Negative for chills, fatigue and fever  Respiratory: Negative for cough, chest tightness, shortness of breath and wheezing  Gastrointestinal: Positive for abdominal distention  Negative for diarrhea, nausea and vomiting  Genitourinary: Positive for frequency and pelvic pain  Negative for flank pain, genital sores, hematuria, urgency, vaginal bleeding and vaginal discharge  Musculoskeletal: Negative for back pain  Neurological: Negative for light-headedness, numbness and headaches       OB Hx:  OB History    Para Term  AB Living   4 4 4 0 0 4   SAB TAB Ectopic Multiple Live Births   0 0 0 0 0      # Outcome Date GA Lbr Jay/2nd Weight Sex Delivery Anes PTL Lv   4 Term            3 Term            2 Term            1 Term              Medical Hx  Past Medical History:   Diagnosis Date    Hyperlipemia      Surgical Hx  Past Surgical History:   Procedure Laterality Date    TUBAL LIGATION       Family Hx  Family History   Problem Relation Age of Onset    Hyperlipidemia Mother     Hypertension Mother     Diabetes Family      Social Hx  Social History     Socioeconomic History    Marital status: Single     Spouse name: Not on file    Number of children: Not on file    Years of education: Not on file    Highest education level: Not on file   Occupational History    Not on file   Social Needs    Financial resource strain: Not on file    Food insecurity:     Worry: Not on file     Inability: Not on file    Transportation needs:     Medical: Not on file     Non-medical: Not on file   Tobacco Use    Smoking status: Former Smoker     Last attempt to quit: 2018     Years since quittin 3    Smokeless tobacco: Never Used    Tobacco comment: pt  reports she quit 5 months ago   Substance and Sexual Activity    Alcohol use: Yes     Comment: Occasional    Drug use: No    Sexual activity: Not on file   Lifestyle    Physical activity:     Days per week: Not on file     Minutes per session: Not on file    Stress: Not on file   Relationships    Social connections:     Talks on phone: Not on file     Gets together: Not on file     Attends Lutheran service: Not on file     Active member of club or organization: Not on file     Attends meetings of clubs or organizations: Not on file     Relationship status: Not on file    Intimate partner violence:     Fear of current or ex partner: Not on file     Emotionally abused: Not on file     Physically abused: Not on file     Forced sexual activity: Not on file   Other Topics Concern    Not on file   Social History Narrative    Not on file     Allergies  No Known Allergies  Meds    Current Outpatient Medications:     ibuprofen (MOTRIN) 600 mg tablet, Take 1 tablet (600 mg total) by mouth every 6 (six) hours as needed for mild pain or moderate pain, Disp: 30 tablet, Rfl: 1    polyethylene glycol (GLYCOLAX) powder, Take 17 g by mouth daily, Disp: 578 g, Rfl: 0    OBJECTIVE:               Vitals  Vitals:    19 0805   BP: 140/83   Pulse: 99       Physical Exam Constitutional: She is oriented to person, place, and time  She appears well-developed and well-nourished  No distress  HENT:   Head: Normocephalic and atraumatic  Cardiovascular: Normal rate, regular rhythm and normal heart sounds  Pulmonary/Chest: Breath sounds normal  No respiratory distress  She exhibits no tenderness  Abdominal: Soft  She exhibits distension and mass  There is no tenderness  There is no rebound and no guarding  Genitourinary: Vagina normal    Genitourinary Comments: SVE/SSE: fibroid 16w uterus, no vaginal prolapse noted, normal external genitalia   Musculoskeletal: Normal range of motion  Neurological: She is alert and oriented to person, place, and time  Skin: Skin is warm and dry  Capillary refill takes less than 2 seconds  She is not diaphoretic  Vitals reviewed        Carlton Garzon DO  PGY-3 OB/GYN   8/29/2019 8:49 AM

## 2019-09-03 ENCOUNTER — HOSPITAL ENCOUNTER (OUTPATIENT)
Dept: ULTRASOUND IMAGING | Facility: HOSPITAL | Age: 39
Discharge: HOME | End: 2019-09-03
Payer: COMMERCIAL

## 2019-09-03 DIAGNOSIS — R10.2 PELVIC PAIN: ICD-10-CM

## 2019-09-03 PROCEDURE — 76856 US EXAM PELVIC COMPLETE: CPT

## 2019-09-03 PROCEDURE — 76830 TRANSVAGINAL US NON-OB: CPT

## 2019-09-18 ENCOUNTER — OFFICE VISIT (OUTPATIENT)
Dept: OBGYN CLINIC | Facility: CLINIC | Age: 39
End: 2019-09-18

## 2019-09-18 VITALS
DIASTOLIC BLOOD PRESSURE: 88 MMHG | HEIGHT: 66 IN | SYSTOLIC BLOOD PRESSURE: 140 MMHG | BODY MASS INDEX: 32.78 KG/M2 | HEART RATE: 91 BPM | WEIGHT: 204 LBS

## 2019-09-18 DIAGNOSIS — R10.2 PELVIC PAIN: Primary | ICD-10-CM

## 2019-09-18 DIAGNOSIS — R87.610 ATYPICAL SQUAMOUS CELLS OF UNDETERMINED SIGNIFICANCE ON CYTOLOGIC SMEAR OF CERVIX (ASC-US): ICD-10-CM

## 2019-09-18 PROCEDURE — 99213 OFFICE O/P EST LOW 20 MIN: CPT | Performed by: OBSTETRICS & GYNECOLOGY

## 2019-09-18 RX ORDER — ACETAMINOPHEN 325 MG/1
650 TABLET ORAL EVERY 6 HOURS PRN
Qty: 30 TABLET | Refills: 0 | Status: SHIPPED | OUTPATIENT
Start: 2019-09-18 | End: 2020-02-03

## 2019-09-18 NOTE — PROGRESS NOTES
ASSESMENT & PLAN:           1  US results - setting of pelvic pain and irregular bleeding   - Slightly enlarged uterus, without signs of adenomyosis, 2 small fibroids, one sub-serosal, the other intramural, no obvious submucosal fibroids noted; reviewed with pt that this is unlikely source of pain, consider SIS to r/o submucosal fibroid, discussed EMB to r/o malignancy and hyperplasia, reviewed surgical vs medical management and discussed that medical management is more appropriate currently, will check labs as well   - Ordered CBC, prolactin (previous TSH drawn last month WNL)   - RTC in 1 week for EMB due to irregular bleeding, age 44, consider possible SIS at subsequent visit   - Pain/bleeding control with Motrin 600mg q6h and Tylenol 650mg q6h   - Recommended that patient make a visit 's to see urogyn clinicians  2  Constipation   - May be source of pelvic pain, continue glycolax, recommended OTC colace, hydration, fiber daily    SUBJECTIVE:             Patient is a 44 y o  at [place] [address] who presents for US results  She has complained of pelvic pain and bleeding for the last 3 months  She also reports her period is irregular, occasionally 1x a month but sometimes 2x a month  Her LMP was 19 and was for 5 days  She is currently taking motrin for the pain  She also uses glycolax to help her bowel movements  She occasionally has straining when she uses the restroom and can feel bloated at times      Review of Systems   Review of Systems   Pelvic pain, menstrual problem, no fevers or chills, no N&V    OB Hx:  OB History    Para Term  AB Living   4 4 4 0 0 4   SAB TAB Ectopic Multiple Live Births   0 0 0 0 0      # Outcome Date GA Lbr Jay/2nd Weight Sex Delivery Anes PTL Lv   4 Term            3 Term            2 Term            1 Term              Medical Hx  Past Medical History:   Diagnosis Date    Hyperlipemia      Surgical Hx  Past Surgical History:   Procedure Laterality Date  TUBAL LIGATION       Family Hx  Family History   Problem Relation Age of Onset   Oksana Rogers Hyperlipidemia Mother     Hypertension Mother     Diabetes Family      Social Hx  Social History     Socioeconomic History    Marital status: Single     Spouse name: Not on file    Number of children: Not on file    Years of education: Not on file    Highest education level: Not on file   Occupational History    Not on file   Social Needs    Financial resource strain: Not on file    Food insecurity:     Worry: Not on file     Inability: Not on file    Transportation needs:     Medical: Not on file     Non-medical: Not on file   Tobacco Use    Smoking status: Former Smoker     Last attempt to quit: 2018     Years since quittin 4    Smokeless tobacco: Never Used    Tobacco comment: pt  reports she quit 5 months ago   Substance and Sexual Activity    Alcohol use: Yes     Comment: Occasional    Drug use: No    Sexual activity: Not on file   Lifestyle    Physical activity:     Days per week: Not on file     Minutes per session: Not on file    Stress: Not on file   Relationships    Social connections:     Talks on phone: Not on file     Gets together: Not on file     Attends Latter day service: Not on file     Active member of club or organization: Not on file     Attends meetings of clubs or organizations: Not on file     Relationship status: Not on file    Intimate partner violence:     Fear of current or ex partner: Not on file     Emotionally abused: Not on file     Physically abused: Not on file     Forced sexual activity: Not on file   Other Topics Concern    Not on file   Social History Narrative    Not on file     Allergies  No Known Allergies  Meds    Current Outpatient Medications:     acetaminophen (TYLENOL) 325 mg tablet, Take 2 tablets (650 mg total) by mouth every 6 (six) hours as needed for mild pain, Disp: 30 tablet, Rfl: 0    ibuprofen (MOTRIN) 600 mg tablet, Take 1 tablet (600 mg total) by mouth every 6 (six) hours as needed for mild pain or moderate pain, Disp: 30 tablet, Rfl: 1    polyethylene glycol (GLYCOLAX) powder, Take 17 g by mouth daily, Disp: 578 g, Rfl: 0    OBJECTIVE:               Vitals  Vitals:    09/18/19 1529   BP: 140/88   Pulse: 91       Physical Exam  - deferred    Marco A Pichardo DO  PGY-3 OB/GYN   9/18/2019 6:51 PM

## 2019-09-20 ENCOUNTER — APPOINTMENT (OUTPATIENT)
Dept: LAB | Facility: HOSPITAL | Age: 39
End: 2019-09-20
Payer: COMMERCIAL

## 2019-09-20 DIAGNOSIS — R10.2 PELVIC PAIN: ICD-10-CM

## 2019-09-20 LAB
BASOPHILS # BLD AUTO: 0 THOUSANDS/ΜL (ref 0–0.1)
BASOPHILS NFR BLD AUTO: 1 % (ref 0–1)
EOSINOPHIL # BLD AUTO: 0.1 THOUSAND/ΜL (ref 0–0.4)
EOSINOPHIL NFR BLD AUTO: 2 % (ref 0–6)
ERYTHROCYTE [DISTWIDTH] IN BLOOD BY AUTOMATED COUNT: 14.4 %
HCT VFR BLD AUTO: 38.2 % (ref 36–46)
HGB BLD-MCNC: 12.7 G/DL (ref 12–16)
LYMPHOCYTES # BLD AUTO: 1.5 THOUSANDS/ΜL (ref 0.5–4)
LYMPHOCYTES NFR BLD AUTO: 27 % (ref 25–45)
MCH RBC QN AUTO: 28.6 PG (ref 26–34)
MCHC RBC AUTO-ENTMCNC: 33.2 G/DL (ref 31–36)
MCV RBC AUTO: 86 FL (ref 80–100)
MONOCYTES # BLD AUTO: 0.4 THOUSAND/ΜL (ref 0.2–0.9)
MONOCYTES NFR BLD AUTO: 6 % (ref 1–10)
NEUTROPHILS # BLD AUTO: 3.6 THOUSANDS/ΜL (ref 1.8–7.8)
NEUTS SEG NFR BLD AUTO: 65 % (ref 45–65)
PLATELET # BLD AUTO: 197 THOUSANDS/UL (ref 150–450)
PMV BLD AUTO: 8.7 FL (ref 8.9–12.7)
PROLACTIN SERPL-MCNC: 12.2 NG/ML
RBC # BLD AUTO: 4.43 MILLION/UL (ref 4–5.2)
WBC # BLD AUTO: 5.6 THOUSAND/UL (ref 4.5–11)

## 2019-09-20 PROCEDURE — 84146 ASSAY OF PROLACTIN: CPT

## 2019-09-20 PROCEDURE — 85025 COMPLETE CBC W/AUTO DIFF WBC: CPT

## 2019-09-20 PROCEDURE — 36415 COLL VENOUS BLD VENIPUNCTURE: CPT

## 2019-09-26 ENCOUNTER — PROCEDURE VISIT (OUTPATIENT)
Dept: OBGYN CLINIC | Facility: CLINIC | Age: 39
End: 2019-09-26

## 2019-09-26 VITALS
DIASTOLIC BLOOD PRESSURE: 95 MMHG | WEIGHT: 202 LBS | HEIGHT: 66 IN | BODY MASS INDEX: 32.47 KG/M2 | HEART RATE: 93 BPM | SYSTOLIC BLOOD PRESSURE: 135 MMHG

## 2019-09-26 DIAGNOSIS — N93.9 ABNORMAL UTERINE BLEEDING (AUB): ICD-10-CM

## 2019-09-26 DIAGNOSIS — N93.9 VAGINAL BLEEDING: Primary | ICD-10-CM

## 2019-09-26 LAB — SL AMB POCT URINE HCG: NEGATIVE

## 2019-09-26 PROCEDURE — 58100 BIOPSY OF UTERUS LINING: CPT | Performed by: OBSTETRICS & GYNECOLOGY

## 2019-09-26 PROCEDURE — 88305 TISSUE EXAM BY PATHOLOGIST: CPT | Performed by: PATHOLOGY

## 2019-09-26 PROCEDURE — 81025 URINE PREGNANCY TEST: CPT | Performed by: OBSTETRICS & GYNECOLOGY

## 2019-09-26 PROCEDURE — 99214 OFFICE O/P EST MOD 30 MIN: CPT | Performed by: OBSTETRICS & GYNECOLOGY

## 2019-09-26 NOTE — PROGRESS NOTES
Endometrial biopsy  Date/Time: 9/26/2019 9:52 AM  Performed by: Jt Holland MD  Authorized by: Jt Holland MD     Consent:     Consent obtained:  Written    Consent given by:  Patient    Procedural risks discussed:  Bleeding, damage to other organs, failure rate, infection, repeat procedure and possible continued pain    Patient questions answered: yes      Patient agrees, verbalizes understanding, and wants to proceed: yes      Educational handouts given: yes      Instructions and paperwork completed: yes    Indication:     Indications: Other disorder of menstruation and other abnormal bleeding from female genital tract    Pre-procedure:     Pre-procedure timeout performed: yes    Procedure:     Procedure: endometrial biopsy with Pipelle      A bivalve speculum was placed in the vagina: yes      Cervix cleaned and prepped: no      A paracervical block was performed: no      An intracervical block was performed: no      The cervix was dilated: no      Uterus sounded: yes      Uterus sound depth (cm):  8    Specimen collected: specimen collected and sent to pathology      Patient tolerated procedure well with no complications: yes    Findings:     Uterus size:  Non-gravid    Cervix: normal        Vitals:    09/26/19 0931   BP: 135/95   Pulse: 93     Patient presents for EMB after TVUS for AUB  TVUS showed two fibroids and a thick endometrial stripe  Patient is premenopausal but in the setting of obesity, EMB appropriate to evaluate endometrium histologically      PE  Gen: alert & oriented x3  CHEST: nonlabored breathing  Skin: warm & dry  Mood and affect normal

## 2019-10-24 ENCOUNTER — OFFICE VISIT (OUTPATIENT)
Dept: OBGYN CLINIC | Facility: CLINIC | Age: 39
End: 2019-10-24

## 2019-10-24 VITALS
BODY MASS INDEX: 32.96 KG/M2 | DIASTOLIC BLOOD PRESSURE: 88 MMHG | SYSTOLIC BLOOD PRESSURE: 131 MMHG | WEIGHT: 204.2 LBS | HEART RATE: 78 BPM

## 2019-10-24 DIAGNOSIS — N93.9 ABNORMAL UTERINE BLEEDING: Primary | ICD-10-CM

## 2019-10-24 PROCEDURE — 99213 OFFICE O/P EST LOW 20 MIN: CPT | Performed by: NURSE PRACTITIONER

## 2019-10-24 NOTE — PATIENT INSTRUCTIONS
Return in December for annual GYN exam and PAP already scheduled  Call with needss or concerns      We are Moving!!  Dear Friends and Patients, We are excited to announce that we will be moving to a new nearby location in early December, 2019! The new address is:    [address], [address] [facility], [address] [place] [address]    Please be on the lookout for additional information as it becomes available  Thank you for trusting us with your care and we look forward to continuing to serve you in the future

## 2019-10-24 NOTE — PROGRESS NOTES
Assessment/Plan:         Diagnoses and all orders for this visit:    Abnormal uterine bleeding       Explained to Pt that endometrial Bx result her WNL  Pt states she was happy results were nomoral and did not reqiure further intervention at this time  States she has annual GYN exam scheduled for December     Plan  Return in December for annual GYN exam and PAP already scheduled  Call with needss or concerns  Pt verbalized understanding of all discussed  Subjective:      Patient ID: Ranjana Baker is a 44 y o  female  HPI  Pt present for endometrial Bx result  Pt has a Hx of fibroid on her uterus and some heavy periods  The following portions of the patient's history were reviewed and updated as appropriate: allergies, current medications, past family history, past medical history, past social history, past surgical history and problem list     Review of Systems      Pertinent items are note in the HPI    Objective:      /88   Pulse 78   Wt 92 6 kg (204 lb 3 2 oz)   BMI 32 96 kg/m²          Physical Exam    Alert and oriented   Denies pain  WNL Respiratory effort

## 2020-02-03 ENCOUNTER — OFFICE VISIT (OUTPATIENT)
Dept: OBGYN CLINIC | Facility: CLINIC | Age: 40
End: 2020-02-03

## 2020-02-03 VITALS
SYSTOLIC BLOOD PRESSURE: 139 MMHG | DIASTOLIC BLOOD PRESSURE: 98 MMHG | BODY MASS INDEX: 33.65 KG/M2 | WEIGHT: 209.4 LBS | HEART RATE: 109 BPM | HEIGHT: 66 IN

## 2020-02-03 DIAGNOSIS — Z12.39 BREAST CANCER SCREENING: ICD-10-CM

## 2020-02-03 DIAGNOSIS — Z12.4 CERVICAL CANCER SCREENING: ICD-10-CM

## 2020-02-03 DIAGNOSIS — Z01.419 ENCOUNTER FOR GYNECOLOGICAL EXAMINATION (GENERAL) (ROUTINE) WITHOUT ABNORMAL FINDINGS: Primary | ICD-10-CM

## 2020-02-03 DIAGNOSIS — Z13.31 POSITIVE DEPRESSION SCREENING: ICD-10-CM

## 2020-02-03 PROCEDURE — 99385 PREV VISIT NEW AGE 18-39: CPT | Performed by: OBSTETRICS & GYNECOLOGY

## 2020-02-03 NOTE — PROGRESS NOTES
Assessment/Plan:     No problem-specific Assessment & Plan notes found for this encounter  Diagnoses and all orders for this visit:    Encounter for gynecological examination (general) (routine) without abnormal findings    Breast cancer screening  -     Mammo screening bilateral w cad; Future    Cervical cancer screening    Positive depression screening    Depression Screening Follow-up Plan: Patient's depression screening was positive with a PHQ-2 score of 2  Their PHQ-9 score was 11  Clinically patient does not have depression  No treatment is required '  BMI Counseling: Body mass index is 33 8 kg/m²  The BMI is above normal  Patient referred to PCP due to patient being obese  Subjective:      Patient ID: Hector Verma is a 44 y o  female presents for [facility] exam  Her last pap was 12-05-18 and was negative with positive HPV  She has never had a mammogram before  HPI    The following portions of the patient's history were reviewed and updated as appropriate: allergies, current medications, past family history, past medical history, past social history, past surgical history and problem list     Review of Systems      Objective:      /98   Pulse (!) 109   Ht 5' 6" (1 676 m)   Wt 95 kg (209 lb 6 4 oz)   BMI 33 80 kg/m²          Physical Exam   Constitutional: She is oriented to person, place, and time  She appears well-developed and well-nourished  No distress  HENT:   Head: Normocephalic  Neck: No thyromegaly present  Cardiovascular: Normal rate  No murmur heard  Pulmonary/Chest: Effort normal  No respiratory distress  She has no wheezes  She has no rales  She exhibits no tenderness  Right breast exhibits no inverted nipple, no mass, no nipple discharge, no skin change and no tenderness  Left breast exhibits no inverted nipple, no mass, no nipple discharge, no skin change and no tenderness  No breast tenderness, discharge or bleeding  Abdominal: Soft   She exhibits no distension and no mass  There is no tenderness  There is no rebound and no guarding  Genitourinary: Vagina normal and uterus normal  Rectal exam shows no external hemorrhoid  No labial fusion  There is no rash, tenderness, lesion or injury on the right labia  There is no rash, tenderness, lesion or injury on the left labia  Cervix exhibits no motion tenderness, no discharge and no friability  Right adnexum displays no mass, no tenderness and no fullness  Left adnexum displays no mass, no tenderness and no fullness  Musculoskeletal: She exhibits no edema  Lymphadenopathy:        Right: No inguinal adenopathy present  Left: No inguinal adenopathy present  Neurological: She is alert and oriented to person, place, and time  Skin: Skin is warm and dry  Psychiatric: She has a normal mood and affect  Her behavior is normal  Judgment and thought content normal    Nursing note and vitals reviewed

## 2020-02-17 ENCOUNTER — TELEPHONE (OUTPATIENT)
Dept: OBGYN CLINIC | Facility: CLINIC | Age: 40
End: 2020-02-17

## 2020-02-17 NOTE — TELEPHONE ENCOUNTER
Patient notified of pap result and the need for colposcopy  Patient reports that her medical insurance is not active  Called Lizeth from [facility] to get patient enrolled in program   Will call patient to schedule upon approval   Patient verbalized understanding

## 2020-02-18 ENCOUNTER — TELEPHONE (OUTPATIENT)
Dept: OBGYN CLINIC | Facility: CLINIC | Age: 40
End: 2020-02-18

## 2020-02-18 NOTE — TELEPHONE ENCOUNTER
Colposcopy scheduled for 2/26/2020 @ 930am  Approved for [facility]  Patient notified

## 2020-02-26 ENCOUNTER — PROCEDURE VISIT (OUTPATIENT)
Dept: OBGYN CLINIC | Facility: CLINIC | Age: 40
End: 2020-02-26

## 2020-02-26 VITALS — DIASTOLIC BLOOD PRESSURE: 88 MMHG | WEIGHT: 212.4 LBS | SYSTOLIC BLOOD PRESSURE: 130 MMHG | BODY MASS INDEX: 34.28 KG/M2

## 2020-02-26 DIAGNOSIS — R87.610 ASCUS WITH POSITIVE HIGH RISK HPV CERVICAL: Primary | ICD-10-CM

## 2020-02-26 DIAGNOSIS — R87.810 ASCUS WITH POSITIVE HIGH RISK HPV CERVICAL: Primary | ICD-10-CM

## 2020-02-26 PROCEDURE — 57454 BX/CURETT OF CERVIX W/SCOPE: CPT | Performed by: OBSTETRICS & GYNECOLOGY

## 2020-02-26 PROCEDURE — 88305 TISSUE EXAM BY PATHOLOGIST: CPT | Performed by: PATHOLOGY

## 2020-02-26 NOTE — PATIENT INSTRUCTIONS
Colposcopia   LO QUE NECESITA SABER:   Idris colposcopia es un procedimiento para buscar células anormales en cantu cerviz y vagina  Cantu médico utilizará un colposcopio, que es un pequeño endoscopio con idris jacqueline en cantu extremo  INSTRUCCIONES SOBRE [facility]ITAL[facility]IA:   Medicamentos:   · Analgésicos:  Usted podría recibir medicamento para quitarle o reducir el dolor  No espere a que el dolor sea muy intenso para lindsay el medicamento  · [place] fatou medicamentos sade se le haya indicado  Llame a cantu médico si usted piensa que el medicamento no está ayudando o si tiene efectos secundarios  Infórmele si es alérgico a cualquier medicamento  Mantenga idris lista actualizada de los [place], las vitaminas y los productos herbales que ruel  Incluya los siguientes datos de los medicamentos: cantidad, frecuencia y motivo de administración  Traiga con usted la lista o los envases de la píldoras a fatou citas de seguimiento  Lleve la lista de los medicamentos con usted en deborah de idris emergencia  Programe idris lisa con cantu médico o ginecólogo sade se le indique:  Es posible que usted necesite regresar para obtener más pruebas o para que se le extraigan células anormales  Anote fatou preguntas para que se acuerde de hacerlas gilberto fatou visitas  Cuidados personales:  Use idris toalla sanitaria para cualquier sangrado leve  Pregunte si puede usar tampones, duchas vaginales o tener [employer]  Si usted [employer], no coloque nada en cantu vagina hasta que cantu médico se lo autorice  Evite levantar objetos pesados en las próximas 24 horas después de cantu procedimiento, ya que esto disminuirá cantu riesgo de hemorragia  Consulte con cantu médico o ginecólogo sí:   · Usted tiene dolor que no se le eleonora, aun después de lindsay medicamentos para el dolor  · Usted tiene fiebre  · Usted tiene preguntas o inquietudes acerca de cantu condición o cuidado    Busque atención médica de inmediato o llame al 911 si:   · Usted presenta sangrado vaginal y [address] es más abundante que kovacs menstruación  © 2016 [address] is for End User's use only and may not be sold, redistributed or otherwise used for commercial purposes  All illustrations and images included in CareNotes® are the copyrighted property of A D A M , Inc  or Ralph Bailon  Esta información es sólo para uso en educación  Kovacs intención no es darle un consejo médico sobre enfermedades o tratamientos  Colsulte con kovacs [place] Canny farmacéutico antes de seguir cualquier régimen médico para saber si es seguro y efectivo para usted

## 2020-02-26 NOTE — PROGRESS NOTES
Colposcopy  Date/Time: 2/26/2020 9:55 AM  Performed by: Diony Payan MD  Authorized by: Diony Payan MD     Consent:     Consent obtained:  Verbal and written    Consent given by:  Patient    Procedural risks discussed:  Bleeding and infection    Patient questions answered: yes      Patient agrees, verbalizes understanding, and wants to proceed: yes      Educational handouts given: yes      Instructions and paperwork completed: yes    Pre-procedure:     Pre-procedure timeout performed: yes    Indication:     Indication:  ASC-US  Procedure:     Procedure: Colposcopy w/ cervical biopsy and ECC      Under satisfactory analgesia the patient was prepped and draped in the dorsal lithotomy position: yes      Quemado speculum was placed in the vagina: yes      Under colposcopic examination the transition zone was seen in entirety: no      Endocervix was curetted using a Kevorkian curette: yes      Cervical biopsy performed with a cervical biopsy punch: yes      Monsel's solution was applied: yes      Biopsy(s): yes      Location:  6 o'clock    Specimen to pathology: no    Post-procedure:     Impression: Low grade cervical dysplasia      Patient tolerance of procedure:   Tolerated well, no immediate complications

## 2020-03-11 ENCOUNTER — OFFICE VISIT (OUTPATIENT)
Dept: OBGYN CLINIC | Facility: CLINIC | Age: 40
End: 2020-03-11

## 2020-03-11 VITALS
DIASTOLIC BLOOD PRESSURE: 82 MMHG | SYSTOLIC BLOOD PRESSURE: 132 MMHG | WEIGHT: 215.6 LBS | HEART RATE: 97 BPM | BODY MASS INDEX: 34.8 KG/M2

## 2020-03-11 DIAGNOSIS — Z71.2 ENCOUNTER TO DISCUSS TEST RESULTS: Primary | ICD-10-CM

## 2020-03-11 PROCEDURE — 99213 OFFICE O/P EST LOW 20 MIN: CPT | Performed by: OBSTETRICS & GYNECOLOGY

## 2020-03-11 PROCEDURE — 1036F TOBACCO NON-USER: CPT | Performed by: OBSTETRICS & GYNECOLOGY

## 2020-03-11 NOTE — PROGRESS NOTES
Assessment/Plan:     No problem-specific Assessment & Plan notes found for this encounter  Diagnoses and all orders for this visit:    Encounter to discuss test results    RTO in one year for annual exam            Subjective:      Patient ID: Hollie Salas is a 44 y o  female presents for colposcopy biopsy results  Per [facility] guidelines, will pap and co-test in one year  Final Diagnosis   A  Endocervix, curettings:     - Extremely scant fragments of unremarkable endocervical glands and rare squamous metaplastic cells      B  Cervix, 6 o'clock, biopsy:     - No significant pathologic abnormalities  - No dysplasia or neoplasia identified           HPI    The following portions of the patient's history were reviewed and updated as appropriate: allergies, current medications, past family history, past medical history, past social history, past surgical history and problem list     Review of Systems      Objective:      /82   Pulse 97   Wt 97 8 kg (215 lb 9 6 oz)   LMP 02/18/2020 (Approximate)   Breastfeeding No   BMI 34 80 kg/m²          Physical Exam   Constitutional: She is oriented to person, place, and time  She appears well-developed and well-nourished  No distress  Pulmonary/Chest: Effort normal    Neurological: She is alert and oriented to person, place, and time  Psychiatric: She has a normal mood and affect  Her behavior is normal    Nursing note and vitals reviewed

## 2020-05-21 ENCOUNTER — TELEPHONE (OUTPATIENT)
Dept: FAMILY MEDICINE CLINIC | Facility: CLINIC | Age: 40
End: 2020-05-21

## 2020-06-02 ENCOUNTER — OFFICE VISIT (OUTPATIENT)
Dept: FAMILY MEDICINE CLINIC | Facility: CLINIC | Age: 40
End: 2020-06-02

## 2020-06-02 VITALS
WEIGHT: 214.4 LBS | SYSTOLIC BLOOD PRESSURE: 128 MMHG | DIASTOLIC BLOOD PRESSURE: 84 MMHG | TEMPERATURE: 97 F | RESPIRATION RATE: 20 BRPM | OXYGEN SATURATION: 98 % | HEIGHT: 66 IN | HEART RATE: 95 BPM | BODY MASS INDEX: 34.46 KG/M2

## 2020-06-02 DIAGNOSIS — R21 RASH: ICD-10-CM

## 2020-06-02 DIAGNOSIS — R20.2 TINGLING: ICD-10-CM

## 2020-06-02 DIAGNOSIS — G47.00 INSOMNIA, UNSPECIFIED TYPE: ICD-10-CM

## 2020-06-02 DIAGNOSIS — E66.09 CLASS 1 OBESITY DUE TO EXCESS CALORIES WITHOUT SERIOUS COMORBIDITY WITH BODY MASS INDEX (BMI) OF 34.0 TO 34.9 IN ADULT: ICD-10-CM

## 2020-06-02 DIAGNOSIS — R53.83 FATIGUE, UNSPECIFIED TYPE: Primary | ICD-10-CM

## 2020-06-02 DIAGNOSIS — E78.5 HYPERLIPIDEMIA, UNSPECIFIED HYPERLIPIDEMIA TYPE: ICD-10-CM

## 2020-06-02 PROCEDURE — 1036F TOBACCO NON-USER: CPT | Performed by: PHYSICIAN ASSISTANT

## 2020-06-02 PROCEDURE — 99214 OFFICE O/P EST MOD 30 MIN: CPT | Performed by: PHYSICIAN ASSISTANT

## 2020-06-02 PROCEDURE — 3008F BODY MASS INDEX DOCD: CPT | Performed by: PHYSICIAN ASSISTANT

## 2020-06-02 RX ORDER — AMMONIUM LACTATE 12 G/100G
LOTION TOPICAL 2 TIMES DAILY PRN
Qty: 400 G | Refills: 0 | Status: SHIPPED | OUTPATIENT
Start: 2020-06-02 | End: 2021-04-13

## 2020-06-02 RX ORDER — TRAZODONE HYDROCHLORIDE 50 MG/1
50 TABLET ORAL
Qty: 30 TABLET | Refills: 1 | Status: SHIPPED | OUTPATIENT
Start: 2020-06-02 | End: 2020-09-02

## 2020-06-02 RX ORDER — TRIAMCINOLONE ACETONIDE 1 MG/G
CREAM TOPICAL DAILY PRN
Qty: 30 G | Refills: 0 | Status: SHIPPED | OUTPATIENT
Start: 2020-06-02 | End: 2021-04-13

## 2020-06-03 PROBLEM — G47.00 INSOMNIA: Status: ACTIVE | Noted: 2020-06-03

## 2020-06-08 ENCOUNTER — LAB (OUTPATIENT)
Dept: LAB | Facility: HOSPITAL | Age: 40
End: 2020-06-08
Payer: COMMERCIAL

## 2020-06-08 DIAGNOSIS — R20.2 TINGLING: ICD-10-CM

## 2020-06-08 DIAGNOSIS — E78.5 HYPERLIPIDEMIA, UNSPECIFIED HYPERLIPIDEMIA TYPE: ICD-10-CM

## 2020-06-08 DIAGNOSIS — R53.83 FATIGUE, UNSPECIFIED TYPE: ICD-10-CM

## 2020-06-08 LAB
ALBUMIN SERPL BCP-MCNC: 4.1 G/DL (ref 3–5.2)
ALP SERPL-CCNC: 67 U/L (ref 43–122)
ALT SERPL W P-5'-P-CCNC: 31 U/L (ref 9–52)
ANION GAP SERPL CALCULATED.3IONS-SCNC: 8 MMOL/L (ref 5–14)
AST SERPL W P-5'-P-CCNC: 33 U/L (ref 14–36)
BASOPHILS # BLD AUTO: 0.1 THOUSANDS/ΜL (ref 0–0.1)
BASOPHILS NFR BLD AUTO: 1 % (ref 0–1)
BILIRUB SERPL-MCNC: 0.5 MG/DL
BUN SERPL-MCNC: 7 MG/DL (ref 5–25)
CALCIUM SERPL-MCNC: 8.9 MG/DL (ref 8.4–10.2)
CHLORIDE SERPL-SCNC: 105 MMOL/L (ref 97–108)
CHOLEST SERPL-MCNC: 262 MG/DL
CO2 SERPL-SCNC: 24 MMOL/L (ref 22–30)
CREAT SERPL-MCNC: 0.66 MG/DL (ref 0.6–1.2)
EOSINOPHIL # BLD AUTO: 0.1 THOUSAND/ΜL (ref 0–0.4)
EOSINOPHIL NFR BLD AUTO: 2 % (ref 0–6)
ERYTHROCYTE [DISTWIDTH] IN BLOOD BY AUTOMATED COUNT: 14.2 %
GFR SERPL CREATININE-BSD FRML MDRD: 111 ML/MIN/1.73SQ M
GLUCOSE P FAST SERPL-MCNC: 105 MG/DL (ref 70–99)
HCT VFR BLD AUTO: 38.8 % (ref 36–46)
HDLC SERPL-MCNC: 42 MG/DL
HGB BLD-MCNC: 12.8 G/DL (ref 12–16)
LYMPHOCYTES # BLD AUTO: 1.5 THOUSANDS/ΜL (ref 0.5–4)
LYMPHOCYTES NFR BLD AUTO: 24 % (ref 25–45)
MCH RBC QN AUTO: 28.4 PG (ref 26–34)
MCHC RBC AUTO-ENTMCNC: 33.1 G/DL (ref 31–36)
MCV RBC AUTO: 86 FL (ref 80–100)
MONOCYTES # BLD AUTO: 0.4 THOUSAND/ΜL (ref 0.2–0.9)
MONOCYTES NFR BLD AUTO: 6 % (ref 1–10)
NEUTROPHILS # BLD AUTO: 4.2 THOUSANDS/ΜL (ref 1.8–7.8)
NEUTS SEG NFR BLD AUTO: 67 % (ref 45–65)
NONHDLC SERPL-MCNC: 220 MG/DL
PLATELET # BLD AUTO: 218 THOUSANDS/UL (ref 150–450)
PMV BLD AUTO: 8.9 FL (ref 8.9–12.7)
POTASSIUM SERPL-SCNC: 3.9 MMOL/L (ref 3.6–5)
PROT SERPL-MCNC: 7.5 G/DL (ref 5.9–8.4)
RBC # BLD AUTO: 4.52 MILLION/UL (ref 4–5.2)
SODIUM SERPL-SCNC: 137 MMOL/L (ref 137–147)
TRIGL SERPL-MCNC: 441 MG/DL
VIT B12 SERPL-MCNC: 235 PG/ML (ref 100–900)
WBC # BLD AUTO: 6.2 THOUSAND/UL (ref 4.5–11)

## 2020-06-08 PROCEDURE — 85025 COMPLETE CBC W/AUTO DIFF WBC: CPT

## 2020-06-08 PROCEDURE — 36415 COLL VENOUS BLD VENIPUNCTURE: CPT

## 2020-06-08 PROCEDURE — 80053 COMPREHEN METABOLIC PANEL: CPT

## 2020-06-08 PROCEDURE — 80061 LIPID PANEL: CPT

## 2020-06-08 PROCEDURE — 82607 VITAMIN B-12: CPT

## 2020-06-09 DIAGNOSIS — E78.1 HYPERTRIGLYCERIDEMIA: Primary | ICD-10-CM

## 2020-06-09 DIAGNOSIS — E53.8 B12 DEFICIENCY: ICD-10-CM

## 2020-06-09 RX ORDER — CYANOCOBALAMIN 1000 UG/ML
1000 INJECTION INTRAMUSCULAR; SUBCUTANEOUS
Status: SHIPPED | OUTPATIENT
Start: 2020-06-10

## 2020-06-09 RX ORDER — CYANOCOBALAMIN (VITAMIN B-12) 500 MCG
500 TABLET ORAL DAILY
Qty: 90 TABLET | Refills: 1 | Status: SHIPPED | OUTPATIENT
Start: 2020-06-09 | End: 2021-04-13

## 2020-06-09 RX ORDER — FENOFIBRATE 160 MG/1
160 TABLET ORAL DAILY
Qty: 90 TABLET | Refills: 1 | Status: SHIPPED | OUTPATIENT
Start: 2020-06-09 | End: 2020-09-01

## 2020-06-12 ENCOUNTER — HOSPITAL ENCOUNTER (OUTPATIENT)
Dept: MAMMOGRAPHY | Facility: CLINIC | Age: 40
Discharge: HOME | End: 2020-06-12
Payer: COMMERCIAL

## 2020-06-12 VITALS — HEIGHT: 66 IN | BODY MASS INDEX: 34.39 KG/M2 | WEIGHT: 214 LBS

## 2020-06-12 DIAGNOSIS — Z12.31 ENCOUNTER FOR SCREENING MAMMOGRAM FOR MALIGNANT NEOPLASM OF BREAST: ICD-10-CM

## 2020-06-12 PROCEDURE — 77067 SCR MAMMO BI INCL CAD: CPT

## 2020-06-12 PROCEDURE — 77063 BREAST TOMOSYNTHESIS BI: CPT

## 2020-08-31 DIAGNOSIS — E78.1 HYPERTRIGLYCERIDEMIA: ICD-10-CM

## 2020-09-01 RX ORDER — FENOFIBRATE 160 MG/1
TABLET ORAL
Qty: 90 TABLET | Refills: 1 | Status: SHIPPED | OUTPATIENT
Start: 2020-09-01 | End: 2021-04-13

## 2020-09-02 ENCOUNTER — OFFICE VISIT (OUTPATIENT)
Dept: FAMILY MEDICINE CLINIC | Facility: CLINIC | Age: 40
End: 2020-09-02

## 2020-09-02 VITALS
HEART RATE: 100 BPM | OXYGEN SATURATION: 96 % | RESPIRATION RATE: 16 BRPM | DIASTOLIC BLOOD PRESSURE: 60 MMHG | SYSTOLIC BLOOD PRESSURE: 118 MMHG | WEIGHT: 212 LBS | BODY MASS INDEX: 34.07 KG/M2 | HEIGHT: 66 IN | TEMPERATURE: 96.8 F

## 2020-09-02 DIAGNOSIS — Z00.00 WELLNESS EXAMINATION: Primary | ICD-10-CM

## 2020-09-02 DIAGNOSIS — Z23 NEED FOR VACCINATION: ICD-10-CM

## 2020-09-02 DIAGNOSIS — E66.09 CLASS 1 OBESITY DUE TO EXCESS CALORIES WITHOUT SERIOUS COMORBIDITY WITH BODY MASS INDEX (BMI) OF 34.0 TO 34.9 IN ADULT: ICD-10-CM

## 2020-09-02 DIAGNOSIS — R20.2 PARESTHESIAS: ICD-10-CM

## 2020-09-02 PROCEDURE — 99396 PREV VISIT EST AGE 40-64: CPT | Performed by: PHYSICIAN ASSISTANT

## 2020-09-02 PROCEDURE — 3008F BODY MASS INDEX DOCD: CPT | Performed by: PHYSICIAN ASSISTANT

## 2020-09-02 PROCEDURE — 3725F SCREEN DEPRESSION PERFORMED: CPT | Performed by: PHYSICIAN ASSISTANT

## 2020-09-02 PROCEDURE — 90715 TDAP VACCINE 7 YRS/> IM: CPT

## 2020-09-02 PROCEDURE — 96372 THER/PROPH/DIAG INJ SC/IM: CPT

## 2020-09-02 PROCEDURE — 90471 IMMUNIZATION ADMIN: CPT

## 2020-09-02 PROCEDURE — 1036F TOBACCO NON-USER: CPT | Performed by: PHYSICIAN ASSISTANT

## 2020-09-02 RX ADMIN — CYANOCOBALAMIN 1000 MCG: 1000 INJECTION INTRAMUSCULAR; SUBCUTANEOUS at 10:00

## 2020-09-02 NOTE — ASSESSMENT & PLAN NOTE
BMI Counseling: Body mass index is 34 22 kg/m²  The BMI is above normal  Nutrition recommendations include 3-5 servings of fruits/vegetables daily, moderation in carbohydrate intake and increasing intake of lean protein  Exercise recommendations include moderate aerobic physical activity for 150 minutes/week

## 2020-09-02 NOTE — PROGRESS NOTES
Assessment/Plan:    Paresthesias  B12 was low normal and she was given injection today  Class 1 obesity due to excess calories without serious comorbidity in adult  BMI Counseling: Body mass index is 34 22 kg/m²  The BMI is above normal  Nutrition recommendations include 3-5 servings of fruits/vegetables daily, moderation in carbohydrate intake and increasing intake of lean protein  Exercise recommendations include moderate aerobic physical activity for 150 minutes/week  Problem List Items Addressed This Visit        Other    Class 1 obesity due to excess calories without serious comorbidity in adult     BMI Counseling: Body mass index is 34 22 kg/m²  The BMI is above normal  Nutrition recommendations include 3-5 servings of fruits/vegetables daily, moderation in carbohydrate intake and increasing intake of lean protein  Exercise recommendations include moderate aerobic physical activity for 150 minutes/week  Paresthesias     B12 was low normal and she was given injection today  Other Visit Diagnoses     Wellness examination    -  Primary    Need for vaccination        Relevant Orders    TDAP VACCINE GREATER THAN OR EQUAL TO 8YO IM (Completed)        recommend yearly flu vaccine    Subjective:      Patient ID: Lb Wylie is a 36 y o  female  HPI  36year old F here for annual wellness visit and for work physical   She has no complaints right now  No PPD needed   She works as a patient care assistant  Diet: normal  Exercise:some walking  Sleep: no concern                Immunizations:nees tdap/ flu  Dental:up to date  Vision:going october  Hearing:no concerns  Output:no issues    The following portions of the patient's history were reviewed and updated as appropriate: She  has a past medical history of Hyperlipemia    She   Patient Active Problem List    Diagnosis Date Noted    Insomnia 06/03/2020    Abnormal uterine bleeding 10/24/2019    Atypical squamous cells of undetermined significance on cytologic smear of cervix (ASC-US) 09/18/2019    Pelvic pain 08/29/2019    Bilateral hand numbness 08/02/2019    Paresthesias     Class 1 obesity due to excess calories without serious comorbidity in adult 02/12/2019    Chronic constipation 01/14/2019    Cervical high risk HPV (human papillomavirus) test positive 12/11/2018    Encounter for annual routine gynecological examination 12/05/2018    Bilateral hand pain 07/17/2018    Pain in both knees 07/17/2018    Bilateral low back pain without sciatica 07/17/2018    Hyperlipidemia 10/27/2017     She  has a past surgical history that includes Tubal ligation  Her family history includes Diabetes in her family; Hyperlipidemia in her mother; Hypertension in her mother; No Known Problems in her maternal aunt, maternal aunt, maternal aunt, maternal grandmother, paternal aunt, paternal aunt, paternal aunt, paternal grandmother, and sister  She  reports that she quit smoking about 2 years ago  She has never used smokeless tobacco  She reports current alcohol use  She reports that she does not use drugs    Current Outpatient Medications   Medication Sig Dispense Refill    ammonium lactate (LAC-HYDRIN) 12 % lotion Apply topically 2 (two) times a day as needed for dry skin 400 g 0    fenofibrate (TRIGLIDE) 160 MG tablet TAKE 1 TABLET BY MOUTH DAILY 90 tablet 1    triamcinolone (KENALOG) 0 1 % cream Apply topically daily as needed for rash 30 g 0    vitamin B-12 (CYANOCOBALAMIN) 500 MCG TABS Take 1 tablet (500 mcg total) by mouth daily 90 tablet 1     Current Facility-Administered Medications   Medication Dose Route Frequency Provider Last Rate Last Dose    cyanocobalamin injection 1,000 mcg  1,000 mcg Intramuscular Q30 Days Kelli Browne PA-C   1,000 mcg at 09/02/20 1000     Current Outpatient Medications on File Prior to Visit   Medication Sig    ammonium lactate (LAC-HYDRIN) 12 % lotion Apply topically 2 (two) times a day as needed for dry skin    fenofibrate (TRIGLIDE) 160 MG tablet TAKE 1 TABLET BY MOUTH DAILY    triamcinolone (KENALOG) 0 1 % cream Apply topically daily as needed for rash    vitamin B-12 (CYANOCOBALAMIN) 500 MCG TABS Take 1 tablet (500 mcg total) by mouth daily    [DISCONTINUED] traZODone (DESYREL) 50 mg tablet Take 1 tablet (50 mg total) by mouth daily at bedtime (Patient not taking: Reported on 9/2/2020)     Current Facility-Administered Medications on File Prior to Visit   Medication    cyanocobalamin injection 1,000 mcg     She has No Known Allergies       Review of Systems   Constitutional: Negative for activity change, appetite change, chills, fatigue and unexpected weight change  HENT: Negative for dental problem, ear pain, hearing loss and sore throat  Eyes: Negative for visual disturbance  Respiratory: Negative for cough and wheezing  Cardiovascular: Negative for chest pain  Gastrointestinal: Negative for abdominal pain, constipation, diarrhea and vomiting  Genitourinary: Negative for difficulty urinating and dysuria  Musculoskeletal: Negative for arthralgias, back pain and myalgias  Skin: Negative for rash  Neurological: Negative for dizziness and headaches  Psychiatric/Behavioral: Negative for behavioral problems  Objective:      /60 (BP Location: Left arm, Patient Position: Sitting, Cuff Size: Large)   Pulse 100   Temp (!) 96 8 °F (36 °C) (Temporal)   Resp 16   Ht 5' 6" (1 676 m)   Wt 96 2 kg (212 lb)   LMP 08/23/2020 (Exact Date)   SpO2 96%   Breastfeeding No   BMI 34 22 kg/m²          Physical Exam  Vitals signs and nursing note reviewed  Constitutional:       Appearance: She is well-developed  HENT:      Head: Normocephalic and atraumatic        Right Ear: External ear normal       Left Ear: External ear normal       Nose: Nose normal    Eyes:      Conjunctiva/sclera: Conjunctivae normal    Neck:      Musculoskeletal: Normal range of motion and neck supple  Thyroid: No thyromegaly  Cardiovascular:      Rate and Rhythm: Normal rate and regular rhythm  Heart sounds: Normal heart sounds  No murmur  Pulmonary:      Effort: Pulmonary effort is normal  No respiratory distress  Breath sounds: Normal breath sounds  Abdominal:      General: Bowel sounds are normal       Palpations: Abdomen is soft  There is no mass  Tenderness: There is no abdominal tenderness  There is no guarding  Musculoskeletal: Normal range of motion  Lymphadenopathy:      Cervical: No cervical adenopathy  Skin:     General: Skin is warm  Neurological:      Mental Status: She is alert and oriented to person, place, and time     Psychiatric:         Behavior: Behavior normal

## 2021-04-09 ENCOUNTER — OFFICE VISIT (OUTPATIENT)
Dept: FAMILY MEDICINE CLINIC | Facility: CLINIC | Age: 41
End: 2021-04-09

## 2021-04-09 VITALS
DIASTOLIC BLOOD PRESSURE: 82 MMHG | SYSTOLIC BLOOD PRESSURE: 104 MMHG | OXYGEN SATURATION: 98 % | HEIGHT: 66 IN | BODY MASS INDEX: 33.93 KG/M2 | TEMPERATURE: 96.4 F | WEIGHT: 211.1 LBS | RESPIRATION RATE: 18 BRPM | HEART RATE: 97 BPM

## 2021-04-09 DIAGNOSIS — E53.8 B12 DEFICIENCY: ICD-10-CM

## 2021-04-09 DIAGNOSIS — E66.09 CLASS 1 OBESITY DUE TO EXCESS CALORIES WITHOUT SERIOUS COMORBIDITY WITH BODY MASS INDEX (BMI) OF 34.0 TO 34.9 IN ADULT: ICD-10-CM

## 2021-04-09 DIAGNOSIS — Z02.89 ENCOUNTER FOR PHYSICAL EXAMINATION RELATED TO EMPLOYMENT: Primary | ICD-10-CM

## 2021-04-09 DIAGNOSIS — K59.09 CHRONIC CONSTIPATION: ICD-10-CM

## 2021-04-09 DIAGNOSIS — M79.89 LEG SWELLING: ICD-10-CM

## 2021-04-09 DIAGNOSIS — E78.1 HYPERTRIGLYCERIDEMIA: ICD-10-CM

## 2021-04-09 PROCEDURE — 99214 OFFICE O/P EST MOD 30 MIN: CPT | Performed by: PHYSICIAN ASSISTANT

## 2021-04-09 PROCEDURE — 3008F BODY MASS INDEX DOCD: CPT | Performed by: OBSTETRICS & GYNECOLOGY

## 2021-04-09 PROCEDURE — 86580 TB INTRADERMAL TEST: CPT

## 2021-04-09 RX ORDER — LINACLOTIDE 145 UG/1
1 CAPSULE, GELATIN COATED ORAL DAILY
Qty: 30 CAPSULE | Refills: 3 | Status: SHIPPED | OUTPATIENT
Start: 2021-04-09 | End: 2021-04-13

## 2021-04-09 NOTE — PROGRESS NOTES
Assessment/Plan:    Chronic constipation  She has tried miralax and fiber OTC  To start linzess  Recommend high fiber diet and increase in water    Leg swelling  Recommend avoiding sodium  Elevate legs and buy OTC compression socks  Class 1 obesity due to excess calories without serious comorbidity in adult  BMI Counseling: Body mass index is 34 07 kg/m²  The BMI is above normal  Nutrition recommendations include 3-5 servings of fruits/vegetables daily and moderation in carbohydrate intake  recommend complex carbs           Problem List Items Addressed This Visit        Digestive    Chronic constipation     She has tried miralax and fiber OTC  To start linzess  Recommend high fiber diet and increase in water         Relevant Medications    linaCLOtide (Linzess) 145 MCG CAPS       Other    Class 1 obesity due to excess calories without serious comorbidity in adult     BMI Counseling: Body mass index is 34 07 kg/m²  The BMI is above normal  Nutrition recommendations include 3-5 servings of fruits/vegetables daily and moderation in carbohydrate intake  recommend complex carbs           Leg swelling     Recommend avoiding sodium  Elevate legs and buy OTC compression socks  Other Visit Diagnoses     Encounter for physical examination related to employment    -  Primary    Relevant Orders    TB Skin Test (Completed)    B12 deficiency        Relevant Orders    CBC and differential    Vitamin B12    Hypertriglyceridemia        Relevant Orders    Comprehensive metabolic panel    Lipid panel            Subjective:      Patient ID: Lauren Melvin is a 39 y o  female  HPI  39 year F here for follow of contstipaton and swelling of hands and feet  She has had constipation for years  She tried miralax and fiber  She does try to drink a lot of water  She is having BM sometimes every 1-3 days  She does have hard BM  She has been having swelling in mostly her feet and legs for the last 1 week   No SOB  No changes in her diet  No changes in her activity  No pain  It is worse at the end of the day  Now it is better  She also need physical for work  She does need PPD  It is for job renewal     The following portions of the patient's history were reviewed and updated as appropriate:   She  has a past medical history of Hyperlipemia  She   Patient Active Problem List    Diagnosis Date Noted    Leg swelling 04/11/2021    Insomnia 06/03/2020    Abnormal uterine bleeding 10/24/2019    Atypical squamous cells of undetermined significance on cytologic smear of cervix (ASC-US) 09/18/2019    Pelvic pain 08/29/2019    Bilateral hand numbness 08/02/2019    Paresthesias     Class 1 obesity due to excess calories without serious comorbidity in adult 02/12/2019    Chronic constipation 01/14/2019    Cervical high risk HPV (human papillomavirus) test positive 12/11/2018    Encounter for annual routine gynecological examination 12/05/2018    Bilateral hand pain 07/17/2018    Pain in both knees 07/17/2018    Bilateral low back pain without sciatica 07/17/2018    Hyperlipidemia 10/27/2017     She  has a past surgical history that includes Tubal ligation  Her family history includes Diabetes in her family; Hyperlipidemia in her mother; Hypertension in her mother; No Known Problems in her maternal aunt, maternal aunt, maternal aunt, maternal grandmother, paternal aunt, paternal aunt, paternal aunt, paternal grandmother, and sister  She  reports that she quit smoking about 2 years ago  She has never used smokeless tobacco  She reports current alcohol use  She reports that she does not use drugs    Current Outpatient Medications   Medication Sig Dispense Refill    ammonium lactate (LAC-HYDRIN) 12 % lotion Apply topically 2 (two) times a day as needed for dry skin 400 g 0    fenofibrate (TRIGLIDE) 160 MG tablet TAKE 1 TABLET BY MOUTH DAILY (Patient not taking: Reported on 4/9/2021) 90 tablet 1    linaCLOtide (Linzess) 145 MCG CAPS Take 1 capsule (145 mcg total) by mouth daily 30 capsule 3    triamcinolone (KENALOG) 0 1 % cream Apply topically daily as needed for rash (Patient not taking: Reported on 4/9/2021) 30 g 0    vitamin B-12 (CYANOCOBALAMIN) 500 MCG TABS Take 1 tablet (500 mcg total) by mouth daily (Patient not taking: Reported on 4/9/2021) 90 tablet 1     Current Facility-Administered Medications   Medication Dose Route Frequency Provider Last Rate Last Admin    cyanocobalamin injection 1,000 mcg  1,000 mcg Intramuscular Q30 Days Kelli Browne PA-C   1,000 mcg at 09/02/20 1000     Current Outpatient Medications on File Prior to Visit   Medication Sig    ammonium lactate (LAC-HYDRIN) 12 % lotion Apply topically 2 (two) times a day as needed for dry skin    fenofibrate (TRIGLIDE) 160 MG tablet TAKE 1 TABLET BY MOUTH DAILY (Patient not taking: Reported on 4/9/2021)    triamcinolone (KENALOG) 0 1 % cream Apply topically daily as needed for rash (Patient not taking: Reported on 4/9/2021)    vitamin B-12 (CYANOCOBALAMIN) 500 MCG TABS Take 1 tablet (500 mcg total) by mouth daily (Patient not taking: Reported on 4/9/2021)     Current Facility-Administered Medications on File Prior to Visit   Medication    cyanocobalamin injection 1,000 mcg     She has No Known Allergies       Review of Systems   Constitutional: Negative for activity change, appetite change, chills, fatigue and unexpected weight change  HENT: Negative for dental problem, ear pain, hearing loss and sore throat  Eyes: Negative for visual disturbance  Respiratory: Negative for cough and wheezing  Cardiovascular: Positive for leg swelling  Negative for chest pain  Gastrointestinal: Positive for constipation  Negative for abdominal pain, diarrhea and vomiting  Genitourinary: Negative for difficulty urinating and dysuria  Musculoskeletal: Negative for arthralgias, back pain and myalgias  Skin: Negative for rash     Neurological: Negative for dizziness and headaches  Psychiatric/Behavioral: Negative for behavioral problems  Objective:      /82 (BP Location: Left arm, Patient Position: Sitting, Cuff Size: Standard)   Pulse 97   Temp (!) 96 4 °F (35 8 °C) (Temporal)   Resp 18   Ht 5' 6" (1 676 m)   Wt 95 8 kg (211 lb 1 6 oz)   LMP 03/23/2021   SpO2 98%   Breastfeeding No   BMI 34 07 kg/m²          Physical Exam  Vitals signs and nursing note reviewed  Constitutional:       Appearance: She is well-developed  HENT:      Head: Normocephalic and atraumatic  Right Ear: External ear normal       Left Ear: External ear normal       Nose: Nose normal    Eyes:      Conjunctiva/sclera: Conjunctivae normal    Neck:      Musculoskeletal: Normal range of motion and neck supple  Thyroid: No thyromegaly  Cardiovascular:      Rate and Rhythm: Normal rate and regular rhythm  Heart sounds: Normal heart sounds  No murmur  Comments: Trace bilateral LE edema    Pulmonary:      Effort: Pulmonary effort is normal  No respiratory distress  Breath sounds: Normal breath sounds  Abdominal:      General: Bowel sounds are normal       Palpations: Abdomen is soft  There is no mass  Tenderness: There is no abdominal tenderness  There is no guarding  Musculoskeletal: Normal range of motion  Lymphadenopathy:      Cervical: No cervical adenopathy  Skin:     General: Skin is warm  Neurological:      Mental Status: She is alert and oriented to person, place, and time     Psychiatric:         Mood and Affect: Mood normal          Behavior: Behavior normal

## 2021-04-09 NOTE — PATIENT INSTRUCTIONS
Edema   LO QUE NECESITA SABER:   El edema es la inflamación por todo cantu cuerpo  El edema usualmente es idris señal de que usted esta reteniendo líquidos  La inflamación podía ser a causa de insuficiencia cardíaca o renal, tiroides, o enfermedad del hígado  También podría ser a causa de [place] antidepresivos, medicamentos para la presión arterial u hormonas  La inflamación repentina alrededor de los labios o la demond podría ser un signo de idris reacción alérgica severa  La inflamación de un brazo o idris pierna podría deberse a idris obstrucción en fatou venas  INSTRUCCIONES SOBRE [facility]ITAL[facility]IA:   Busque atención médica de inmediato si:  · Usted tiene falta de aire mientras está en reposo, especialmente cuando se acuesta  · Escupe flema rosada y espumosa cuando tose    · Usted tiene dolor en el pecho  · Cantu latido cardíaco es rápido o irregular  Comuníquese con cantu médico si:  · El área inflamada se siente fría y está pálida o de color prasanna  · El área inflamada se siente cálida, [place] y se ve de color [place]  · Usted tiene más inflamación o inflamación en otras partes de cantu cuerpo  · Usted tiene preguntas o inquietudes acerca de cantu condición o cuidado  Medicamentos:  · Los medicamentos ayudan a eliminar el líquido corporal adicional      · [place] fatou medicamentos sade se le haya indicado  Consulte con cantu médico si usted evangelina que cantu medicamento no le está ayudando o si presenta efectos secundarios  Infórmele si es alérgico a cualquier medicamento  Mantenga idris lista actualizada de los [place], las vitaminas y los productos herbales que ruel  Incluya los siguientes datos de los medicamentos: cantidad, frecuencia y motivo de administración  Traiga con usted la lista o los envases de las píldoras a fatou citas de seguimiento  Lleve la lista de los medicamentos con usted en deborah de idris emergencia  Acuda a fatou consultas de control con cantu médico según le indicaron   Anote fatou preguntas para que se acuerde de hacerlas gilberto fatou visitas  Controle el edema:  · Eleve fatou brazos o piernas sade se le indique  Elévelos por encima del nivel de cantu corazón con la mayor frecuencia posible  Mendes va a disminuir inflamación y el dolor  Apóyelos sobre almohadas o cobijas para mantenerlos elevados cómodamente  · Use medias de compresión sade se le indique  Las medias son ajustadas y ejercen presión a fatou piernas  Mendes ayuda a evitar que el líquido se acumule en fatou piernas o tobillos  · Limite el consumo de sal  La sal provoca que cantu cuerpo retenga agua  Pregunte acerca de cualquier otro cambio para cantu dieta  · Manténgase activo  No esté de pie o sentado por [employer]  Pregunte a cantu médico acerca del mejor plan de ejercicio para usted  · Mantenga cantu piel humectada usando loción, crema o pomada  Pregunte a cantu médico que usar y con qué frecuencia  © Copyright [address] Information is for End User's use only and may not be sold, redistributed or otherwise used for commercial purposes  All illustrations and images included in CareNotes® are the copyrighted property of [employer]  or [address] [place] [address] es sólo para uso en educación  Cantu intención no es darle un consejo médico sobre enfermedades o tratamientos  Colsulte con cantu Daphne Angst farmacéutico antes de seguir cualquier régimen médico para saber si es seguro y efectivo para usted

## 2021-04-11 PROBLEM — M79.89 LEG SWELLING: Status: ACTIVE | Noted: 2021-04-11

## 2021-04-11 NOTE — ASSESSMENT & PLAN NOTE
BMI Counseling: Body mass index is 34 07 kg/m²  The BMI is above normal  Nutrition recommendations include 3-5 servings of fruits/vegetables daily and moderation in carbohydrate intake  recommend complex carbs

## 2021-04-11 NOTE — ASSESSMENT & PLAN NOTE
She has tried miralax and fiber OTC  To start linzess   Recommend high fiber diet and increase in water

## 2021-04-12 ENCOUNTER — CLINICAL SUPPORT (OUTPATIENT)
Dept: FAMILY MEDICINE CLINIC | Facility: CLINIC | Age: 41
End: 2021-04-12

## 2021-04-12 ENCOUNTER — LAB (OUTPATIENT)
Dept: LAB | Facility: HOSPITAL | Age: 41
End: 2021-04-12
Payer: COMMERCIAL

## 2021-04-12 DIAGNOSIS — Z11.1 ENCOUNTER FOR PPD SKIN TEST READING: Primary | ICD-10-CM

## 2021-04-12 DIAGNOSIS — E53.8 B12 DEFICIENCY: ICD-10-CM

## 2021-04-12 DIAGNOSIS — E78.1 HYPERTRIGLYCERIDEMIA: ICD-10-CM

## 2021-04-12 LAB
ALBUMIN SERPL BCP-MCNC: 4 G/DL (ref 3–5.2)
ALP SERPL-CCNC: 63 U/L (ref 43–122)
ALT SERPL W P-5'-P-CCNC: 17 U/L
ANION GAP SERPL CALCULATED.3IONS-SCNC: 9 MMOL/L (ref 5–14)
AST SERPL W P-5'-P-CCNC: 28 U/L (ref 14–36)
BASOPHILS # BLD AUTO: 0 THOUSANDS/ΜL (ref 0–0.1)
BASOPHILS NFR BLD AUTO: 1 % (ref 0–1)
BILIRUB SERPL-MCNC: 0.34 MG/DL
BUN SERPL-MCNC: 5 MG/DL (ref 5–25)
CALCIUM SERPL-MCNC: 9.1 MG/DL (ref 8.4–10.2)
CHLORIDE SERPL-SCNC: 103 MMOL/L (ref 97–108)
CHOLEST SERPL-MCNC: 223 MG/DL
CO2 SERPL-SCNC: 26 MMOL/L (ref 22–30)
CREAT SERPL-MCNC: 0.7 MG/DL (ref 0.6–1.2)
EOSINOPHIL # BLD AUTO: 0.1 THOUSAND/ΜL (ref 0–0.4)
EOSINOPHIL NFR BLD AUTO: 2 % (ref 0–6)
ERYTHROCYTE [DISTWIDTH] IN BLOOD BY AUTOMATED COUNT: 15 %
GFR SERPL CREATININE-BSD FRML MDRD: 108 ML/MIN/1.73SQ M
GLUCOSE P FAST SERPL-MCNC: 103 MG/DL (ref 70–99)
HCT VFR BLD AUTO: 37.5 % (ref 36–46)
HDLC SERPL-MCNC: 46 MG/DL
HGB BLD-MCNC: 12.3 G/DL (ref 12–16)
INDURATION: 0 MM
LDLC SERPL CALC-MCNC: 149 MG/DL
LYMPHOCYTES # BLD AUTO: 1.5 THOUSANDS/ΜL (ref 0.5–4)
LYMPHOCYTES NFR BLD AUTO: 23 % (ref 25–45)
MCH RBC QN AUTO: 28.1 PG (ref 26–34)
MCHC RBC AUTO-ENTMCNC: 32.7 G/DL (ref 31–36)
MCV RBC AUTO: 86 FL (ref 80–100)
MONOCYTES # BLD AUTO: 0.5 THOUSAND/ΜL (ref 0.2–0.9)
MONOCYTES NFR BLD AUTO: 7 % (ref 1–10)
NEUTROPHILS # BLD AUTO: 4.3 THOUSANDS/ΜL (ref 1.8–7.8)
NEUTS SEG NFR BLD AUTO: 67 % (ref 45–65)
NONHDLC SERPL-MCNC: 177 MG/DL
PLATELET # BLD AUTO: 209 THOUSANDS/UL (ref 150–450)
PMV BLD AUTO: 8.8 FL (ref 8.9–12.7)
POTASSIUM SERPL-SCNC: 4 MMOL/L (ref 3.6–5)
PROT SERPL-MCNC: 7.4 G/DL (ref 5.9–8.4)
RBC # BLD AUTO: 4.37 MILLION/UL (ref 4–5.2)
SODIUM SERPL-SCNC: 138 MMOL/L (ref 137–147)
TB SKIN TEST: NEGATIVE
TRIGL SERPL-MCNC: 141 MG/DL
VIT B12 SERPL-MCNC: 356 PG/ML (ref 100–900)
WBC # BLD AUTO: 6.4 THOUSAND/UL (ref 4.5–11)

## 2021-04-12 PROCEDURE — 80053 COMPREHEN METABOLIC PANEL: CPT

## 2021-04-12 PROCEDURE — 36415 COLL VENOUS BLD VENIPUNCTURE: CPT

## 2021-04-12 PROCEDURE — 82607 VITAMIN B-12: CPT

## 2021-04-12 PROCEDURE — 85025 COMPLETE CBC W/AUTO DIFF WBC: CPT

## 2021-04-12 PROCEDURE — 80061 LIPID PANEL: CPT

## 2021-04-13 ENCOUNTER — ANNUAL EXAM (OUTPATIENT)
Dept: OBGYN CLINIC | Facility: CLINIC | Age: 41
End: 2021-04-13

## 2021-04-13 VITALS
SYSTOLIC BLOOD PRESSURE: 128 MMHG | HEART RATE: 85 BPM | WEIGHT: 212.4 LBS | BODY MASS INDEX: 34.28 KG/M2 | DIASTOLIC BLOOD PRESSURE: 84 MMHG

## 2021-04-13 DIAGNOSIS — Z12.4 CERVICAL CANCER SCREENING: ICD-10-CM

## 2021-04-13 DIAGNOSIS — Z12.39 ENCOUNTER FOR SCREENING FOR MALIGNANT NEOPLASM OF BREAST, UNSPECIFIED SCREENING MODALITY: ICD-10-CM

## 2021-04-13 DIAGNOSIS — Z01.419 ENCOUNTER FOR GYNECOLOGICAL EXAMINATION (GENERAL) (ROUTINE) WITHOUT ABNORMAL FINDINGS: Primary | ICD-10-CM

## 2021-04-13 PROCEDURE — 99396 PREV VISIT EST AGE 40-64: CPT | Performed by: OBSTETRICS & GYNECOLOGY

## 2021-04-13 PROCEDURE — 1036F TOBACCO NON-USER: CPT | Performed by: OBSTETRICS & GYNECOLOGY

## 2021-04-13 PROCEDURE — 3725F SCREEN DEPRESSION PERFORMED: CPT | Performed by: OBSTETRICS & GYNECOLOGY

## 2021-04-13 PROCEDURE — 87624 HPV HI-RISK TYP POOLED RSLT: CPT | Performed by: OBSTETRICS & GYNECOLOGY

## 2021-04-13 PROCEDURE — G0145 SCR C/V CYTO,THINLAYER,RESCR: HCPCS | Performed by: OBSTETRICS & GYNECOLOGY

## 2021-04-13 NOTE — PROGRESS NOTES
Assessment/Plan:     No problem-specific Assessment & Plan notes found for this encounter  Diagnoses and all orders for this visit:    Encounter for gynecological examination (general) (routine) without abnormal findings    Encounter for screening for malignant neoplasm of breast, unspecified screening modality  -     Mammo screening bilateral w 3d & cad; Future    Cervical cancer screening  -     Liquid-based pap, screening      Depression Screening Follow-up Plan: Patient's depression screening was positive with a PHQ-2 score of 1  Their PHQ-9 score was 4  Clinically patient does not have depression  No treatment is required  Subjective:      Patient ID: Mali Frankel is a 39 y o  female who presents for annual exam   Her last pap was 02/03/20 and was ASCUS with + other HPV  She then had a colpo which was normal   Her last mammogram 06/12/20 and was birads 1  She has a tubal ligation for birth control  Her only complaint is a sensation of "air" in the vagina with intercourse  She has no pain or abnormal bleeding  HPI    The following portions of the patient's history were reviewed and updated as appropriate: allergies, current medications, past family history, past medical history, past social history, past surgical history and problem list     Review of Systems      Objective:      /84   Pulse 85   Wt 96 3 kg (212 lb 6 4 oz)   LMP 03/19/2021 (Exact Date)   BMI 34 28 kg/m²          Physical Exam  Vitals signs and nursing note reviewed  Exam conducted with a chaperone present  Constitutional:       General: She is not in acute distress  Appearance: She is well-developed  HENT:      Head: Normocephalic  Neck:      Thyroid: No thyromegaly  Cardiovascular:      Rate and Rhythm: Normal rate and regular rhythm  Heart sounds: Normal heart sounds  No murmur  Pulmonary:      Effort: Pulmonary effort is normal  No respiratory distress        Breath sounds: Normal breath sounds  No wheezing or rales  Chest:      Chest wall: No tenderness  Breasts:         Right: No swelling, bleeding, inverted nipple, mass, nipple discharge, skin change or tenderness  Left: No swelling, bleeding, inverted nipple, mass, nipple discharge, skin change or tenderness  Abdominal:      General: Bowel sounds are normal  There is no distension  Palpations: Abdomen is soft  There is no mass  Tenderness: There is no abdominal tenderness  There is no guarding or rebound  Genitourinary:     Labia:         Right: No rash, tenderness, lesion or injury  Left: No rash, tenderness, lesion or injury  Vagina: Normal       Cervix: No cervical motion tenderness, discharge or friability  Uterus: Normal        Adnexa:         Right: No mass, tenderness or fullness  Left: No mass, tenderness or fullness  Rectum: No external hemorrhoid  Skin:     General: Skin is warm and dry  Neurological:      Mental Status: She is alert and oriented to person, place, and time  Psychiatric:         Behavior: Behavior normal          Thought Content:  Thought content normal          Judgment: Judgment normal

## 2021-04-14 LAB
HPV HR 12 DNA CVX QL NAA+PROBE: NEGATIVE
HPV16 DNA CVX QL NAA+PROBE: NEGATIVE
HPV18 DNA CVX QL NAA+PROBE: NEGATIVE

## 2021-04-16 ENCOUNTER — TELEPHONE (OUTPATIENT)
Dept: FAMILY MEDICINE CLINIC | Facility: CLINIC | Age: 41
End: 2021-04-16

## 2021-04-16 NOTE — RESULT ENCOUNTER NOTE
Triglyceride level is much better 441-->149   Her LD cholesterol was still higher than normal though at 149  Mateusz Maloney Recommend getting regular exercise of at least moderate intensity 3 5 hours a week  I also recommend eating a low carbohydrate diet and making sure that carbs are whole grain when eaten  Also recommend trying to avoid unhealthy fats such as vegetable oil, canola oil, peanut oil  These are often cooked with but can be found in a lot of snacks such as cookies, cakes, chips        b12 level in improving but she should take 500mcg daily

## 2021-04-19 LAB
LAB AP GYN PRIMARY INTERPRETATION: NORMAL
Lab: NORMAL

## 2021-04-26 ENCOUNTER — CLINICAL SUPPORT (OUTPATIENT)
Dept: FAMILY MEDICINE CLINIC | Facility: CLINIC | Age: 41
End: 2021-04-26

## 2021-04-26 DIAGNOSIS — Z23 ENCOUNTER FOR IMMUNIZATION: ICD-10-CM

## 2021-04-26 DIAGNOSIS — Z11.1 ENCOUNTER FOR PPD SKIN TEST READING: Primary | ICD-10-CM

## 2021-04-26 PROCEDURE — 86580 TB INTRADERMAL TEST: CPT

## 2021-04-28 ENCOUNTER — CLINICAL SUPPORT (OUTPATIENT)
Dept: FAMILY MEDICINE CLINIC | Facility: CLINIC | Age: 41
End: 2021-04-28

## 2021-04-28 DIAGNOSIS — Z11.1 ENCOUNTER FOR PPD SKIN TEST READING: Primary | ICD-10-CM

## 2021-04-28 LAB
INDURATION: 0 MM
TB SKIN TEST: NEGATIVE

## 2021-07-02 ENCOUNTER — HOSPITAL ENCOUNTER (OUTPATIENT)
Dept: MAMMOGRAPHY | Facility: CLINIC | Age: 41
Discharge: HOME | End: 2021-07-02
Payer: COMMERCIAL

## 2021-07-02 VITALS — BODY MASS INDEX: 34.07 KG/M2 | WEIGHT: 212 LBS | HEIGHT: 66 IN

## 2021-07-02 DIAGNOSIS — Z12.39 ENCOUNTER FOR SCREENING FOR MALIGNANT NEOPLASM OF BREAST, UNSPECIFIED SCREENING MODALITY: ICD-10-CM

## 2021-07-02 PROCEDURE — 77063 BREAST TOMOSYNTHESIS BI: CPT

## 2021-07-02 PROCEDURE — 77067 SCR MAMMO BI INCL CAD: CPT

## 2022-09-29 ENCOUNTER — OFFICE VISIT (OUTPATIENT)
Dept: FAMILY MEDICINE CLINIC | Facility: CLINIC | Age: 42
End: 2022-09-29

## 2022-09-29 VITALS
WEIGHT: 206.5 LBS | TEMPERATURE: 97.6 F | RESPIRATION RATE: 16 BRPM | OXYGEN SATURATION: 98 % | BODY MASS INDEX: 33.19 KG/M2 | HEIGHT: 66 IN | HEART RATE: 90 BPM | DIASTOLIC BLOOD PRESSURE: 78 MMHG | SYSTOLIC BLOOD PRESSURE: 124 MMHG

## 2022-09-29 DIAGNOSIS — Z00.00 ANNUAL PHYSICAL EXAM: Primary | ICD-10-CM

## 2022-09-29 DIAGNOSIS — Z13.29 THYROID DISORDER SCREEN: ICD-10-CM

## 2022-09-29 DIAGNOSIS — Z13.220 ENCOUNTER FOR SCREENING FOR LIPID DISORDER: ICD-10-CM

## 2022-09-29 DIAGNOSIS — M54.50 ACUTE LEFT-SIDED LOW BACK PAIN WITHOUT SCIATICA: ICD-10-CM

## 2022-09-29 DIAGNOSIS — Z13.1 SCREENING FOR DIABETES MELLITUS: ICD-10-CM

## 2022-09-29 DIAGNOSIS — Z12.31 SCREENING MAMMOGRAM FOR BREAST CANCER: ICD-10-CM

## 2022-09-29 DIAGNOSIS — Z13.0 SCREENING FOR DEFICIENCY ANEMIA: ICD-10-CM

## 2022-09-29 PROBLEM — M79.641 BILATERAL HAND PAIN: Status: RESOLVED | Noted: 2018-07-17 | Resolved: 2022-09-29

## 2022-09-29 PROBLEM — Z01.419 ENCOUNTER FOR ANNUAL ROUTINE GYNECOLOGICAL EXAMINATION: Status: RESOLVED | Noted: 2018-12-05 | Resolved: 2022-09-29

## 2022-09-29 PROBLEM — M79.642 BILATERAL HAND PAIN: Status: RESOLVED | Noted: 2018-07-17 | Resolved: 2022-09-29

## 2022-09-29 PROBLEM — R10.2 PELVIC PAIN: Status: RESOLVED | Noted: 2019-08-29 | Resolved: 2022-09-29

## 2022-09-29 PROBLEM — R20.0 BILATERAL HAND NUMBNESS: Status: RESOLVED | Noted: 2019-08-02 | Resolved: 2022-09-29

## 2022-09-29 PROCEDURE — 99396 PREV VISIT EST AGE 40-64: CPT | Performed by: PHYSICIAN ASSISTANT

## 2022-09-29 PROCEDURE — 99214 OFFICE O/P EST MOD 30 MIN: CPT | Performed by: PHYSICIAN ASSISTANT

## 2022-09-29 RX ORDER — METHOCARBAMOL 750 MG/1
750 TABLET, FILM COATED ORAL EVERY 6 HOURS PRN
Qty: 60 TABLET | Refills: 0 | Status: SHIPPED | OUTPATIENT
Start: 2022-09-29

## 2022-09-29 NOTE — PROGRESS NOTES
[address] [facility]    NAME: Yara Carolina  AGE: 43 y o  SEX: female  : 1980     DATE: 2022     Assessment and Plan:     Problem List Items Addressed This Visit        Other    Acute left-sided low back pain without sciatica     Likely muscular in origin  Start methocarbamol as needed  Continue Advil as ded  Warm/cool compresses  Stretch daily  Examples shown in office at visit and handout given  Follow up if no improvement  Relevant Medications    methocarbamol (Robaxin-750) 750 mg tablet    Annual physical exam - Primary     Discussed general health recommendations and screening guidelines  Up to date on recommended vaccinations  Up to date on cervical cancer screening  Due for mammogram  Recommend routine dental and eye exams  Next physical one year  BMI 33 0-33 9,adult     See BMI counseling  Other Visit Diagnoses     Screening mammogram for breast cancer        Relevant Orders    Mammo screening bilateral w 3d & cad    Screening for diabetes mellitus        Relevant Orders    Comprehensive metabolic panel    HEMOGLOBIN A1C W/ EAG ESTIMATION    Encounter for screening for lipid disorder        Relevant Orders    Lipid Panel with Direct LDL reflex    Thyroid disorder screen        Relevant Orders    TSH, 3rd generation with Free T4 reflex    Screening for deficiency anemia        Relevant Orders    CBC and differential          Immunizations and preventive care screenings were discussed with patient today  Appropriate education was printed on patient's after visit summary  Counseling:  Alcohol/drug use: discussed moderation in alcohol intake, the recommendations for healthy alcohol use, and avoidance of illicit drug use  Dental Health: discussed importance of regular tooth brushing, flossing, and dental visits    Injury prevention: discussed safety/seat belts, safety helmets, smoke detectors, carbon dioxide detectors, and smoking near bedding or upholstery  Sexual health: discussed sexually transmitted diseases, partner selection, use of condoms, avoidance of unintended pregnancy, and contraceptive alternatives  · Exercise: the importance of regular exercise/physical activity was discussed  Recommend exercise 3-5 times per week for at least 30 minutes  Depression Screening and Follow-up Plan: Patient was screened for depression during today's encounter  They screened negative with a PHQ-2 score of 0  Return in 1 year (on 9/29/2023)  Chief Complaint:     Chief Complaint   Patient presents with    Physical Exam     42 y/o       History of Present Illness:     Adult Annual Physical   Patient here for a comprehensive physical exam  The patient reports problems - low back pain  Started a couple days ago  Isolated to her left low back  Denies radiation  It comes and goes  Worse with movement  Achy pain  Can not rate it on a pain scale  Denies injury or trauma  Denies numbness, tingling, or weakness  Denies redness, swelling, or bruising  Denies warmth or rash  Denies saddle anesthesia  Denies fecal or urinary incontinence  She has been taking advil with small improvement  Diet and Physical Activity  · Diet/Nutrition: poor diet, limited junk food and limited fruits/vegetables  · Exercise: no formal exercise  Depression Screening  PHQ-2/9 Depression Screening    Little interest or pleasure in doing things: 0 - not at all  Feeling down, depressed, or hopeless: 0 - not at all  PHQ-2 Score: 0  PHQ-2 Interpretation: Negative depression screen       General Health  · Sleep: sleeps well and gets 7-8 hours of sleep on average  · Hearing: normal - bilateral   · Vision: no vision problems and goes for regular eye exams  · Dental: regular dental visits and brushes teeth twice daily         /GYN Health  · Patient is: perimenopausal  · Last menstrual period: 9/18/22  · Contraceptive method: s/p tubal ligation  Review of Systems:     Review of Systems   Constitutional: Negative for activity change, appetite change, chills, diaphoresis, fatigue, fever and unexpected weight change  HENT: Negative for congestion, dental problem, hearing loss, sore throat, tinnitus and trouble swallowing  Eyes: Negative for visual disturbance  Respiratory: Negative for cough, chest tightness, shortness of breath and wheezing  Cardiovascular: Negative for chest pain, palpitations and leg swelling  Gastrointestinal: Negative for abdominal pain, blood in stool, constipation, diarrhea, nausea and vomiting  Endocrine: Negative for cold intolerance, heat intolerance, polydipsia, polyphagia and polyuria  Genitourinary: Negative for difficulty urinating, dysuria, frequency, hematuria, menstrual problem, pelvic pain and urgency  Musculoskeletal: Positive for back pain  Negative for arthralgias, gait problem, joint swelling, myalgias, neck pain and neck stiffness  Skin: Negative for color change, pallor, rash and wound  Neurological: Negative for dizziness, tremors, syncope, weakness, light-headedness, numbness and headaches  Hematological: Negative for adenopathy        Past Medical History:     Past Medical History:   Diagnosis Date    Abnormal uterine bleeding 10/24/2019    Atypical squamous cells of undetermined significance on cytologic smear of cervix (ASC-US) 9/18/2019    + AGC + HPV other positive    Bilateral hand pain 7/17/2018    Cervical high risk HPV (human papillomavirus) test positive 12/11/2018    PAP WNL needs re-PAP and co-testing in 1 year    Hyperlipemia     Hyperlipidemia 10/27/2017    Insomnia 6/3/2020      Past Surgical History:     Past Surgical History:   Procedure Laterality Date    TUBAL LIGATION        Social History:     Social History     Socioeconomic History    Marital status: Single     Spouse name: None    Number of children: 4    Years of education: None    Highest education level: High school graduate   Occupational History    None   Tobacco Use    Smoking status: Former Smoker     Quit date: 2018     Years since quittin 4    Smokeless tobacco: Never Used    Tobacco comment: pt  reports she quit 5 months ago   Substance and Sexual Activity    Alcohol use: Yes     Comment: Occasional    Drug use: No    Sexual activity: Yes     Partners: Male     Birth control/protection: Female Sterilization   Other Topics Concern    None   Social History Narrative    None     Social Determinants of Health     Financial Resource Strain: Not on file   Food Insecurity: Not on file   Transportation Needs: Not on file   Physical Activity: Not on file   Stress: Not on file   Social Connections: Not on file   Intimate Partner Violence: Not on file   Housing Stability: Not on file      Family History:     Family History   Problem Relation Age of Onset    Hyperlipidemia Mother     Hypertension Mother     Diabetes Family     No Known Problems Sister     No Known Problems Maternal Grandmother     No Known Problems Paternal Grandmother     No Known Problems Maternal Aunt     No Known Problems Maternal Aunt     No Known Problems Maternal Aunt     No Known Problems Paternal Aunt     No Known Problems Paternal Aunt     No Known Problems Paternal Aunt       Current Medications:     Current Outpatient Medications   Medication Sig Dispense Refill    methocarbamol (Robaxin-750) 750 mg tablet Take 1 tablet (750 mg total) by mouth every 6 (six) hours as needed for muscle spasms 60 tablet 0    vitamin B-12 (CYANOCOBALAMIN) 500 MCG TABS TAKE 1 TABLET BY MOUTH DAILY 90 tablet 0     Current Facility-Administered Medications   Medication Dose Route Frequency Provider Last Rate Last Admin    cyanocobalamin injection 1,000 mcg  1,000 mcg Intramuscular Q30 Days Kelli Bronwe PA-C   1,000 mcg at 20 1000      Allergies:     No Known Allergies Physical Exam:     /78 (BP Location: Left arm, Patient Position: Sitting, Cuff Size: Standard)   Pulse 90   Temp 97 6 °F (36 4 °C) (Temporal)   Resp 16   Ht 5' 6" (1 676 m)   Wt 93 7 kg (206 lb 8 oz)   LMP 09/18/2022 (Exact Date)   SpO2 98%   BMI 33 33 kg/m²     Physical Exam  Vitals and nursing note reviewed  Constitutional:       General: She is awake  She is not in acute distress  Appearance: Normal appearance  She is well-developed and well-groomed  She is obese  She is not ill-appearing  HENT:      Head: Normocephalic and atraumatic  Right Ear: Hearing, tympanic membrane, ear canal and external ear normal       Left Ear: Hearing, tympanic membrane, ear canal and external ear normal       Nose: Nose normal       Mouth/Throat:      Lips: Pink  Mouth: Mucous membranes are moist       Pharynx: Oropharynx is clear  Uvula midline  No oropharyngeal exudate or posterior oropharyngeal erythema  Tonsils: No tonsillar exudate or tonsillar abscesses  Eyes:      General: No scleral icterus  Extraocular Movements: Extraocular movements intact  Conjunctiva/sclera: Conjunctivae normal       Pupils: Pupils are equal, round, and reactive to light  Neck:      Vascular: No carotid bruit  Cardiovascular:      Rate and Rhythm: Normal rate and regular rhythm  Pulses: Normal pulses  Heart sounds: Normal heart sounds  No murmur heard  Pulmonary:      Effort: Pulmonary effort is normal  No respiratory distress  Breath sounds: Normal breath sounds and air entry  No decreased air movement  No decreased breath sounds, wheezing, rhonchi or rales  Abdominal:      General: Abdomen is flat  Bowel sounds are normal  There is no distension  Palpations: Abdomen is soft  There is no mass  Tenderness: There is no abdominal tenderness  There is no right CVA tenderness, left CVA tenderness, guarding or rebound  Hernia: No hernia is present     Musculoskeletal: General: Normal range of motion  Cervical back: Normal, full passive range of motion without pain, normal range of motion and neck supple  No torticollis  Thoracic back: Normal  No scoliosis  Lumbar back: Tenderness (left paraspinals) present  No swelling, edema, deformity, signs of trauma, lacerations, spasms or bony tenderness  Normal range of motion  Negative right straight leg raise test and negative left straight leg raise test  No scoliosis  Right lower leg: No edema  Left lower leg: No edema  Lymphadenopathy:      Cervical: No cervical adenopathy  Skin:     General: Skin is warm  Capillary Refill: Capillary refill takes less than 2 seconds  Coloration: Skin is not jaundiced  Findings: No rash  Neurological:      General: No focal deficit present  Mental Status: She is alert and oriented to person, place, and time  Mental status is at baseline  Cranial Nerves: Cranial nerves are intact  Sensory: Sensation is intact  Motor: Motor function is intact  Coordination: Coordination is intact  Gait: Gait is intact  Deep Tendon Reflexes: Reflexes are normal and symmetric  Psychiatric:         Attention and Perception: Attention and perception normal          Mood and Affect: Mood and affect normal          Speech: Speech normal          Behavior: Behavior normal  Behavior is cooperative  Thought Content:  Thought content normal          Cognition and Memory: Cognition and memory normal          Judgment: Judgment normal           Betsey Rosado [place] [id number]

## 2022-09-29 NOTE — PATIENT INSTRUCTIONS
Wellness Visit for Adults   AMBULATORY CARE:   A wellness visit  is when you see your healthcare provider to get screened for health problems  Your healthcare provider will also give you advice on how to stay healthy  Write down your questions so you remember to ask them  Ask your healthcare provider how often you should have a wellness visit  What happens at a wellness visit:  Your healthcare provider will ask about your health, and your family history of health problems  This includes high blood pressure, heart disease, and cancer  He or she will ask if you have symptoms that concern you, if you smoke, and about your mood  You may also be asked about your intake of medicines, supplements, food, and alcohol  Any of the following may be done:  · Your weight  will be checked  Your height may also be checked so your body mass index (BMI) can be calculated  Your BMI shows if you are at a healthy weight  · Your blood pressure  and heart rate will be checked  Your temperature may also be checked  · Blood and urine tests  may be done  Blood tests may be done to check your cholesterol levels  Abnormal cholesterol levels increase your risk for heart disease and stroke  You may also need a blood or urine test to check for diabetes if you are at increased risk  Urine tests may be done to look for signs of an infection or kidney disease  · A physical exam  includes checking your heartbeat and lungs with a stethoscope  Your healthcare provider may also check your skin to look for sun damage  · Screening tests  may be recommended  A screening test is done to check for diseases that may not cause symptoms  The screening tests you may need depend on your age, gender, family history, and lifestyle habits  For example, colorectal screening may be recommended if you are 48years old or older  Screening tests you need if you are a woman:   · A Pap smear  is used to screen for cervical cancer   Pap smears are usually done every 3 to 5 years depending on your age  You may need them more often if you have had abnormal Pap smear test results in the past  Ask your healthcare provider how often you should have a Pap smear  · A mammogram  is an x-ray of your breasts to screen for breast cancer  Experts recommend mammograms every 2 years starting at age 48 years  You may need a mammogram at age 52 years or younger if you have an increased risk for breast cancer  Talk to your healthcare provider about when you should start having mammograms and how often you need them  Vaccines you may need:   · Get an influenza vaccine  every year  The influenza vaccine protects you from the flu  Several types of viruses cause the flu  The viruses change over time, so new vaccines are made each year  · Get a tetanus-diphtheria (Td) booster vaccine  every 10 years  This vaccine protects you against tetanus and diphtheria  Tetanus is a severe infection that may cause painful muscle spasms and lockjaw  Diphtheria is a severe bacterial infection that causes a thick covering in the back of your mouth and throat  · Get a human papillomavirus (HPV) vaccine  if you are female and aged 23 to 32 or male 23 to 24 and never received it  This vaccine protects you from HPV infection  HPV is the most common infection spread by sexual contact  HPV may also cause vaginal, penile, and anal cancers  · Get a pneumococcal vaccine  if you are aged 72 years or older  The pneumococcal vaccine is an injection given to protect you from pneumococcal disease  Pneumococcal disease is an infection caused by pneumococcal bacteria  The infection may cause pneumonia, meningitis, or an ear infection  · Get a shingles vaccine  if you are 60 or older, even if you have had shingles before  The shingles vaccine is an injection to protect you from the varicella-zoster virus  This is the same virus that causes chickenpox   Shingles is a painful rash that develops in people who had chickenpox or have been exposed to the virus  How to eat healthy:  My Plate is a model for planning healthy meals  It shows the types and amounts of foods that should go on your plate  Fruits and vegetables make up about half of your plate, and grains and protein make up the other half  A serving of dairy is included on the side of your plate  The amount of calories and serving sizes you need depends on your age, gender, weight, and height  Examples of healthy foods are listed below:  · Eat a variety of vegetables  such as dark green, red, and orange vegetables  You can also include canned vegetables low in sodium (salt) and frozen vegetables without added butter or sauces  · Eat a variety of fresh fruits , canned fruit in 100% juice, frozen fruit, and dried fruit  · Include whole grains  At least half of the grains you eat should be whole grains  Examples include whole-wheat bread, wheat pasta, brown rice, and whole-grain cereals such as oatmeal     · Eat a variety of protein foods such as seafood (fish and shellfish), lean meat, and poultry without skin (turkey and chicken)  Examples of lean meats include pork leg, shoulder, or tenderloin, and beef round, sirloin, tenderloin, and extra lean ground beef  Other protein foods include eggs and egg substitutes, beans, peas, soy products, nuts, and seeds  · Choose low-fat dairy products such as skim or 1% milk or low-fat yogurt, cheese, and cottage cheese  · Limit unhealthy fats  such as butter, hard margarine, and shortening  Exercise:  Exercise at least 30 minutes per day on most days of the week  Some examples of exercise include walking, biking, dancing, and swimming  You can also fit in more physical activity by taking the stairs instead of the elevator or parking farther away from stores  Include muscle strengthening activities 2 days each week  Regular exercise provides many health benefits   It helps you manage your weight, and decreases your risk for type 2 diabetes, heart disease, stroke, and high blood pressure  Exercise can also help improve your mood  Ask your healthcare provider about the best exercise plan for you  General health and safety guidelines:   · Do not smoke  Nicotine and other chemicals in cigarettes and cigars can cause lung damage  Ask your healthcare provider for information if you currently smoke and need help to quit  E-cigarettes or smokeless tobacco still contain nicotine  Talk to your healthcare provider before you use these products  · Limit alcohol  A drink of alcohol is 12 ounces of beer, 5 ounces of wine, or 1½ ounces of liquor  · Lose weight, if needed  Being overweight increases your risk of certain health conditions  These include heart disease, high blood pressure, type 2 diabetes, and certain types of cancer  · Protect your skin  Do not sunbathe or use tanning beds  Use sunscreen with a SPF 15 or higher  Apply sunscreen at least 15 minutes before you go outside  Reapply sunscreen every 2 hours  Wear protective clothing, hats, and sunglasses when you are outside  · Drive safely  Always wear your seatbelt  Make sure everyone in your car wears a seatbelt  A seatbelt can save your life if you are in an accident  Do not use your cell phone when you are driving  This could distract you and cause an accident  Pull over if you need to make a call or send a text message  · Practice safe sex  Use latex condoms if are sexually active and have more than one partner  Your healthcare provider may recommend screening tests for sexually transmitted infections (STIs)  · Wear helmets, lifejackets, and protective gear  Always wear a helmet when you ride a bike or motorcycle, go skiing, or play sports that could cause a head injury  Wear protective equipment when you play sports  Wear a lifejacket when you are on a boat or doing water sports      © Copyright [employer] 2022 Information is for End User's use only and may not be sold, redistributed or otherwise used for commercial purposes  All illustrations and images included in CareNotes® are the copyrighted property of [employer]  or [employer]ieves [employer]en  The above information is an  only  It is not intended as medical advice for individual conditions or treatments  Talk to your doctor, nurse or pharmacist before following any medical regimen to see if it is safe and effective for you  Weight Management   AMBULATORY CARE:   Why it is important to manage your weight:  Being overweight increases your risk of health conditions such as heart disease, high blood pressure, type 2 diabetes, and certain types of cancer  It can also increase your risk for osteoarthritis, sleep apnea, and other respiratory problems  Aim for a slow, steady weight loss  Even a small amount of weight loss can lower your risk of health problems  Risks of being overweight:  Extra weight can cause many health problems, including the following:  · Diabetes (high blood sugar level)    · High blood pressure or high cholesterol    · Heart disease    · Stroke    · Gallbladder or liver disease    · Cancer of the colon, breast, prostate, liver, or kidney    · Sleep apnea    · Arthritis or gout    Screening  is done to check for health conditions before you have signs or symptoms  If you are 28to 79years old, your blood sugar level may be checked every 3 years for signs of prediabetes or diabetes  Your healthcare provider will check your blood pressure at each visit  High blood pressure can lead to a stroke or other problems  Your provider may check for signs of heart disease, cancer, or other health problems  How to lose weight safely:  A safe and healthy way to lose weight is to eat fewer calories and get regular exercise  · You can lose up about 1 pound a week by decreasing the number of calories you eat by 500 calories each day   You can decrease calories by eating smaller portion sizes or by cutting out high-calorie foods  Read labels to find out how many calories are in the foods you eat  · You can also burn calories with exercise such as walking, swimming, or biking  You will be more likely to keep weight off if you make these changes part of your lifestyle  Exercise at least 30 minutes per day on most days of the week  You can also fit in more physical activity by taking the stairs instead of the elevator or parking farther away from stores  Ask your healthcare provider about the best exercise plan for you  Healthy meal plan for weight management:  A healthy meal plan includes a variety of foods, contains fewer calories, and helps you stay healthy  A healthy meal plan includes the following:     · Eat whole-grain foods more often  A healthy meal plan should contain fiber  Fiber is the part of grains, fruits, and vegetables that is not broken down by your body  Whole-grain foods are healthy and provide extra fiber in your diet  Some examples of whole-grain foods are whole-wheat breads and pastas, oatmeal, brown rice, and bulgur  · Eat a variety of vegetables every day  Include dark, leafy greens such as spinach, kale, tanner greens, and mustard greens  Eat yellow and orange vegetables such as carrots, sweet potatoes, and winter squash  · Eat a variety of fruits every day  Choose fresh or canned fruit (canned in its own juice or light syrup) instead of juice  Fruit juice has very little or no fiber  · Eat low-fat dairy foods  Drink fat-free (skim) milk or 1% milk  Eat fat-free yogurt and low-fat cottage cheese  Try low-fat cheeses such as mozzarella and other reduced-fat cheeses  · Choose meat and other protein foods that are low in fat  Choose beans or other legumes such as split peas or lentils  Choose fish, skinless poultry (chicken or turkey), or lean cuts of red meat (beef or pork)   Before you cook meat or poultry, cut off any visible fat  · Use less fat and oil  Try baking foods instead of frying them  Add less fat, such as margarine, sour cream, regular salad dressing and mayonnaise to foods  Eat fewer high-fat foods  Some examples of high-fat foods include french fries, doughnuts, ice cream, and cakes  · Eat fewer sweets  Limit foods and drinks that are high in sugar  This includes candy, cookies, regular soda, and sweetened drinks  Ways to decrease calories:   · Eat smaller portions  ? Use a small plate with smaller servings  ? Do not eat second helpings  ? When you eat at a restaurant, ask for a box and place half of your meal in the box before you eat  ? Share an entrée with someone else  · Replace high-calorie snacks with healthy, low-calorie snacks  ? Choose fresh fruit, vegetables, fat-free rice cakes, or air-popped popcorn instead of potato chips, nuts, or chocolate  ? Choose water or calorie-free drinks instead of soda or sweetened drinks  · Do not shop for groceries when you are hungry  You may be more likely to make unhealthy food choices  Take a grocery list of healthy foods and shop after you have eaten  · Eat regular meals  Do not skip meals  Skipping meals can lead to overeating later in the day  This can make it harder for you to lose weight  Eat a healthy snack in place of a meal if you do not have time to eat a regular meal  Talk with a dietitian to help you create a meal plan and schedule that is right for you  Other things to consider as you try to lose weight:   · Be aware of situations that may give you the urge to overeat, such as eating while watching television  Find ways to avoid these situations  For example, read a book, go for a walk, or do crafts  · Meet with a weight loss support group or friends who are also trying to lose weight  This may help you stay motivated to continue working on your weight loss goals      © Copyright [employer] 2022 Information is for End User's use only and may not be sold, redistributed or otherwise used for commercial purposes  All illustrations and images included in CareNotes® are the copyrighted property of [employer]  or [employer]ieves Andersen  The above information is an  only  It is not intended as medical advice for individual conditions or treatments  Talk to your doctor, nurse or pharmacist before following any medical regimen to see if it is safe and effective for you

## 2022-09-30 PROBLEM — G47.00 INSOMNIA: Status: RESOLVED | Noted: 2020-06-03 | Resolved: 2022-09-30

## 2022-09-30 PROBLEM — R87.810 CERVICAL HIGH RISK HPV (HUMAN PAPILLOMAVIRUS) TEST POSITIVE: Status: RESOLVED | Noted: 2018-12-11 | Resolved: 2022-09-30

## 2022-09-30 PROBLEM — Z00.00 ANNUAL PHYSICAL EXAM: Status: ACTIVE | Noted: 2018-12-05

## 2022-09-30 PROBLEM — E78.5 HYPERLIPIDEMIA: Status: RESOLVED | Noted: 2017-10-27 | Resolved: 2022-09-30

## 2022-09-30 PROBLEM — N93.9 ABNORMAL UTERINE BLEEDING: Status: RESOLVED | Noted: 2019-10-24 | Resolved: 2022-09-30

## 2022-09-30 PROBLEM — E66.811 CLASS 1 OBESITY DUE TO EXCESS CALORIES WITHOUT SERIOUS COMORBIDITY IN ADULT: Status: RESOLVED | Noted: 2019-02-12 | Resolved: 2022-09-30

## 2022-09-30 PROBLEM — K59.09 CHRONIC CONSTIPATION: Status: RESOLVED | Noted: 2019-01-14 | Resolved: 2022-09-30

## 2022-09-30 PROBLEM — M79.89 LEG SWELLING: Status: RESOLVED | Noted: 2021-04-11 | Resolved: 2022-09-30

## 2022-09-30 PROBLEM — M25.562 PAIN IN BOTH KNEES: Status: RESOLVED | Noted: 2018-07-17 | Resolved: 2022-09-30

## 2022-09-30 PROBLEM — R87.610 ATYPICAL SQUAMOUS CELLS OF UNDETERMINED SIGNIFICANCE ON CYTOLOGIC SMEAR OF CERVIX (ASC-US): Status: RESOLVED | Noted: 2019-09-18 | Resolved: 2022-09-30

## 2022-09-30 PROBLEM — E66.09 CLASS 1 OBESITY DUE TO EXCESS CALORIES WITHOUT SERIOUS COMORBIDITY IN ADULT: Status: RESOLVED | Noted: 2019-02-12 | Resolved: 2022-09-30

## 2022-09-30 PROBLEM — M25.561 PAIN IN BOTH KNEES: Status: RESOLVED | Noted: 2018-07-17 | Resolved: 2022-09-30

## 2022-09-30 NOTE — ASSESSMENT & PLAN NOTE
Likely muscular in origin  Start methocarbamol as needed  Continue Advil as ded  Warm/cool compresses  Stretch daily  Examples shown in office at visit and handout given  Follow up if no improvement

## 2022-09-30 NOTE — ASSESSMENT & PLAN NOTE
Discussed general health recommendations and screening guidelines  Up to date on recommended vaccinations  Up to date on cervical cancer screening  Due for mammogram  Recommend routine dental and eye exams  Next physical one year

## 2022-10-03 ENCOUNTER — APPOINTMENT (OUTPATIENT)
Dept: LAB | Facility: HOSPITAL | Age: 42
End: 2022-10-03
Payer: MEDICARE

## 2022-10-03 DIAGNOSIS — Z13.29 THYROID DISORDER SCREEN: ICD-10-CM

## 2022-10-03 DIAGNOSIS — Z13.1 SCREENING FOR DIABETES MELLITUS: ICD-10-CM

## 2022-10-03 DIAGNOSIS — Z13.0 SCREENING FOR DEFICIENCY ANEMIA: ICD-10-CM

## 2022-10-03 DIAGNOSIS — Z13.220 ENCOUNTER FOR SCREENING FOR LIPID DISORDER: ICD-10-CM

## 2022-10-03 LAB
ALBUMIN SERPL BCP-MCNC: 4.3 G/DL (ref 3.5–5)
ALP SERPL-CCNC: 61 U/L (ref 43–122)
ALT SERPL W P-5'-P-CCNC: 17 U/L
ANION GAP SERPL CALCULATED.3IONS-SCNC: 8 MMOL/L (ref 5–14)
AST SERPL W P-5'-P-CCNC: 23 U/L (ref 14–36)
BASOPHILS # BLD AUTO: 0.03 THOUSANDS/ΜL (ref 0–0.1)
BASOPHILS NFR BLD AUTO: 0 % (ref 0–1)
BILIRUB SERPL-MCNC: 0.73 MG/DL (ref 0.2–1)
BUN SERPL-MCNC: 6 MG/DL (ref 5–25)
CALCIUM SERPL-MCNC: 9 MG/DL (ref 8.4–10.2)
CHLORIDE SERPL-SCNC: 104 MMOL/L (ref 96–108)
CHOLEST SERPL-MCNC: 259 MG/DL
CO2 SERPL-SCNC: 26 MMOL/L (ref 21–32)
CREAT SERPL-MCNC: 0.7 MG/DL (ref 0.6–1.2)
EOSINOPHIL # BLD AUTO: 0.1 THOUSAND/ΜL (ref 0–0.61)
EOSINOPHIL NFR BLD AUTO: 1 % (ref 0–6)
ERYTHROCYTE [DISTWIDTH] IN BLOOD BY AUTOMATED COUNT: 13.6 % (ref 11.6–15.1)
EST. AVERAGE GLUCOSE BLD GHB EST-MCNC: 114 MG/DL
GFR SERPL CREATININE-BSD FRML MDRD: 107 ML/MIN/1.73SQ M
GLUCOSE P FAST SERPL-MCNC: 99 MG/DL (ref 70–99)
HBA1C MFR BLD: 5.6 %
HCT VFR BLD AUTO: 40.1 % (ref 34.8–46.1)
HDLC SERPL-MCNC: 42 MG/DL
HGB BLD-MCNC: 12.5 G/DL (ref 11.5–15.4)
IMM GRANULOCYTES # BLD AUTO: 0.03 THOUSAND/UL (ref 0–0.2)
IMM GRANULOCYTES NFR BLD AUTO: 0 % (ref 0–2)
LDLC SERPL CALC-MCNC: 184 MG/DL
LYMPHOCYTES # BLD AUTO: 1.44 THOUSANDS/ΜL (ref 0.6–4.47)
LYMPHOCYTES NFR BLD AUTO: 20 % (ref 14–44)
MCH RBC QN AUTO: 27.7 PG (ref 26.8–34.3)
MCHC RBC AUTO-ENTMCNC: 31.2 G/DL (ref 31.4–37.4)
MCV RBC AUTO: 89 FL (ref 82–98)
MONOCYTES # BLD AUTO: 0.42 THOUSAND/ΜL (ref 0.17–1.22)
MONOCYTES NFR BLD AUTO: 6 % (ref 4–12)
NEUTROPHILS # BLD AUTO: 5.25 THOUSANDS/ΜL (ref 1.85–7.62)
NEUTS SEG NFR BLD AUTO: 73 % (ref 43–75)
NRBC BLD AUTO-RTO: 0 /100 WBCS
PLATELET # BLD AUTO: 216 THOUSANDS/UL (ref 149–390)
PMV BLD AUTO: 10.5 FL (ref 8.9–12.7)
POTASSIUM SERPL-SCNC: 4.1 MMOL/L (ref 3.5–5.3)
PROT SERPL-MCNC: 8 G/DL (ref 6.4–8.4)
RBC # BLD AUTO: 4.51 MILLION/UL (ref 3.81–5.12)
SODIUM SERPL-SCNC: 138 MMOL/L (ref 135–147)
TRIGL SERPL-MCNC: 164 MG/DL
TSH SERPL DL<=0.05 MIU/L-ACNC: 1.76 UIU/ML (ref 0.45–4.5)
WBC # BLD AUTO: 7.27 THOUSAND/UL (ref 4.31–10.16)

## 2022-10-03 PROCEDURE — 83036 HEMOGLOBIN GLYCOSYLATED A1C: CPT

## 2022-10-03 PROCEDURE — 80061 LIPID PANEL: CPT

## 2022-10-03 PROCEDURE — 84443 ASSAY THYROID STIM HORMONE: CPT

## 2022-10-03 PROCEDURE — 36415 COLL VENOUS BLD VENIPUNCTURE: CPT

## 2022-10-03 PROCEDURE — 85025 COMPLETE CBC W/AUTO DIFF WBC: CPT

## 2022-10-03 PROCEDURE — 80053 COMPREHEN METABOLIC PANEL: CPT

## 2022-10-17 ENCOUNTER — ANNUAL EXAM (OUTPATIENT)
Dept: OBGYN CLINIC | Facility: CLINIC | Age: 42
End: 2022-10-17

## 2022-10-17 VITALS
BODY MASS INDEX: 32.53 KG/M2 | DIASTOLIC BLOOD PRESSURE: 85 MMHG | HEART RATE: 84 BPM | WEIGHT: 202.4 LBS | HEIGHT: 66 IN | SYSTOLIC BLOOD PRESSURE: 125 MMHG

## 2022-10-17 DIAGNOSIS — R39.9 LOWER URINARY TRACT SYMPTOMS (LUTS): ICD-10-CM

## 2022-10-17 DIAGNOSIS — Z01.419 ENCOUNTER FOR GYNECOLOGICAL EXAMINATION (GENERAL) (ROUTINE) WITHOUT ABNORMAL FINDINGS: Primary | ICD-10-CM

## 2022-10-17 DIAGNOSIS — N89.8 VAGINA ITCHING: ICD-10-CM

## 2022-10-17 LAB
SL AMB  POCT GLUCOSE, UA: NORMAL
SL AMB LEUKOCYTE ESTERASE,UA: NORMAL
SL AMB POCT BILIRUBIN,UA: NORMAL
SL AMB POCT BLOOD,UA: NORMAL
SL AMB POCT CLARITY,UA: NORMAL
SL AMB POCT COLOR,UA: YELLOW
SL AMB POCT KETONES,UA: NORMAL
SL AMB POCT NITRITE,UA: NORMAL
SL AMB POCT PH,UA: 6
SL AMB POCT SPECIFIC GRAVITY,UA: 1.02
SL AMB POCT URINE PROTEIN: NORMAL
SL AMB POCT UROBILINOGEN: NORMAL

## 2022-10-17 PROCEDURE — 99396 PREV VISIT EST AGE 40-64: CPT | Performed by: OBSTETRICS & GYNECOLOGY

## 2022-10-17 PROCEDURE — 81002 URINALYSIS NONAUTO W/O SCOPE: CPT | Performed by: OBSTETRICS & GYNECOLOGY

## 2022-10-17 NOTE — PROGRESS NOTES
Assessment/Plan:     No problem-specific Assessment & Plan notes found for this encounter  Diagnoses and all orders for this visit:    Encounter for gynecological examination (general) (routine) without abnormal findings    Lower urinary tract symptoms (LUTS)  -     POCT urine dip    Vagina itching  -     hydrocortisone 2 5 % ointment; Apply topically 2 (two) times a day          Depression Screening Follow-up Plan: Patient's depression screening was positive with a PHQ-2 score of 0  Their PHQ-9 score was 5  Clinically patient does not have depression  No treatment is required  Subjective:      Patient ID: Marina Dennis is a 43 y o  female who presents for annual exam   Her last pap was 04/13/21 and was negative with negative HPV  Her mammogram is scheduled  She declines STD testing  She has a tubal for birth control  She reports external labial itching/irritation  She does use scented soap- advised unscented Dove  HPI    The following portions of the patient's history were reviewed and updated as appropriate: allergies, current medications, past family history, past medical history, past social history, past surgical history and problem list     Review of Systems      Objective:      /85   Pulse 84   Ht 5' 6" (1 676 m)   Wt 91 8 kg (202 lb 6 4 oz)   LMP 10/08/2022   BMI 32 67 kg/m²          Physical Exam  Vitals and nursing note reviewed  Exam conducted with a chaperone present  Constitutional:       General: She is not in acute distress  Appearance: She is well-developed  HENT:      Head: Normocephalic  Neck:      Thyroid: No thyromegaly  Cardiovascular:      Rate and Rhythm: Normal rate and regular rhythm  Heart sounds: Normal heart sounds  No murmur heard  Pulmonary:      Effort: Pulmonary effort is normal  No respiratory distress  Breath sounds: Normal breath sounds  No wheezing or rales  Chest:      Chest wall: No tenderness     Breasts: Right: No swelling, bleeding, inverted nipple, mass, nipple discharge, skin change or tenderness  Left: No swelling, bleeding, inverted nipple, mass, nipple discharge, skin change or tenderness  Abdominal:      General: Bowel sounds are normal  There is no distension  Palpations: Abdomen is soft  There is no mass  Tenderness: There is no abdominal tenderness  There is no guarding or rebound  Genitourinary:     Labia:         Right: No rash, tenderness, lesion or injury  Left: No rash, tenderness, lesion or injury  Vagina: Normal       Cervix: Normal       Uterus: Normal        Adnexa:         Right: No mass, tenderness or fullness  Left: No mass, tenderness or fullness  Rectum: No external hemorrhoid  Skin:     General: Skin is warm and dry  Neurological:      Mental Status: She is alert and oriented to person, place, and time  Psychiatric:         Behavior: Behavior normal          Thought Content:  Thought content normal          Judgment: Judgment normal

## 2022-11-25 ENCOUNTER — HOSPITAL ENCOUNTER (OUTPATIENT)
Dept: MAMMOGRAPHY | Facility: CLINIC | Age: 42
Discharge: HOME | End: 2022-11-25

## 2022-11-25 VITALS — BODY MASS INDEX: 32.47 KG/M2 | HEIGHT: 66 IN | WEIGHT: 202 LBS

## 2022-11-25 DIAGNOSIS — Z12.31 SCREENING MAMMOGRAM FOR BREAST CANCER: ICD-10-CM

## 2023-03-27 ENCOUNTER — APPOINTMENT (OUTPATIENT)
Dept: LAB | Facility: CLINIC | Age: 43
End: 2023-03-27

## 2023-03-27 DIAGNOSIS — Z11.1 SCREENING-PULMONARY TB: ICD-10-CM

## 2023-03-29 LAB
GAMMA INTERFERON BACKGROUND BLD IA-ACNC: 0.03 IU/ML
M TB IFN-G BLD-IMP: NEGATIVE
M TB IFN-G CD4+ BCKGRND COR BLD-ACNC: 0 IU/ML
M TB IFN-G CD4+ BCKGRND COR BLD-ACNC: 0 IU/ML
MITOGEN IGNF BCKGRD COR BLD-ACNC: >10 IU/ML

## 2023-03-30 ENCOUNTER — TELEPHONE (OUTPATIENT)
Dept: FAMILY MEDICINE CLINIC | Facility: CLINIC | Age: 43
End: 2023-03-30

## 2023-03-30 NOTE — TELEPHONE ENCOUNTER
03/30/23    FORM: [facility] / HEALTH EXAMINATION       Pt Picked-Up Form    Form scanned into pt chart

## 2023-08-29 NOTE — PROGRESS NOTES
Name: Mag Solorzano      : 1980      MRN: [id number]  Encounter Provider: BRITTANY Shearer  Encounter Date: 2023   Encounter department: [address] [facility] [address]     1. Dizziness  Assessment & Plan:  Suspect dizziness & headaches are secondary to hearing loss. Will check labs, f/u audiology, trial meclizine. Orders:  -     CBC and differential; Future  -     meclizine (ANTIVERT) 12.5 MG tablet; Take 1 tablet (12.5 mg total) by mouth 3 (three) times a day as needed for dizziness  -     ECG 12 lead; Future    2. Obesity (BMI 30-39.9)  -     Comprehensive metabolic panel; Future  -     TSH, 3rd generation with Free T4 reflex; Future    3. Vitamin D deficiency  -     Vitamin D 25 hydroxy; Future    4. Acute left-sided low back pain without sciatica    5. Vitamin B12 deficiency  -     Vitamin B12; Future    6. Bilateral hearing loss, unspecified hearing loss type  -     Ambulatory Referral to Audiology; Future    7. Urinary frequency  Assessment & Plan:  - check labs & UA. Do not suspect acute infection given length of symptoms of lack of accompanying  symptoms as per ROS. Orders:  -     UA (URINE) with reflex to Scope    8. SOB (shortness of breath)  Assessment & Plan:  - SOB only with exertion  - will check labs & EKG. Denies hx of smoking/second hand exposure. No associated cough, wheezing. Does not exercise. Suspect it is deconditioning. BMI Counseling: There is no height or weight on file to calculate BMI. The BMI is above normal. Nutrition recommendations include decreasing portion sizes, encouraging healthy choices of fruits and vegetables, decreasing fast food intake, consuming healthier snacks, limiting drinks that contain sugar, moderation in carbohydrate intake, increasing intake of lean protein, reducing intake of saturated and trans fat and reducing intake of cholesterol.  Exercise recommendations include moderate physical activity 150 minutes/week. No pharmacotherapy was ordered. Rationale for BMI follow-up plan is due to patient being overweight or obese. Traci Ortiz is a 37 y.o. female  has a past medical history of Abnormal uterine bleeding, Atypical squamous cells of undetermined significance on cytologic smear of cervix (ASC-US), Bilateral hand pain, Cervical high risk HPV (human papillomavirus) test positive, Hyperlipemia, Hyperlipidemia, and Insomnia. has a past surgical history that includes Tubal ligation. Patient presents for evaluation of dizziness. The symptoms started several months ago and have stabilized. The attacks occur daily and last a few minutes. Positions that worsen symptoms: none. Previous workup/treatments: none. Associated ear symptoms: hearing loss, chronic but worsening. Associated symptoms: SOB with exertion, headaches, urinary frequency (> 8 months). No near syncope, other neuro symptoms as per ROS. Recent infections: none. Head trauma: denied. Drug ingestion: none. Noise exposure: no occupational exposure. Family history: none relevant to this issue. Regular periods- lasts 5 days, goes through 6 tampons per day, not saturated. Denies overt signs of bleeding, last ophthalmalogy exam was 8 months ago & she wears glasses no visual issues. Drinks about 48 oz of water per day.  reports that she snores heavilg, no witnessed episodes of sleep apnea, daytime sleepiness, BMI <30, age < 48, normal appearing neck size, cisgender female. STOP-BANG score of 2- low risk for sleep apnea. Review of Systems   Constitutional: Positive for fatigue. Negative for chills and fever. HENT: Positive for hearing loss. Negative for congestion, dental problem, drooling, ear discharge, ear pain, facial swelling, mouth sores, nosebleeds, postnasal drip, rhinorrhea, sinus pressure, sinus pain, sneezing, sore throat, tinnitus, trouble swallowing and voice change.     Eyes: Negative. Negative for pain and visual disturbance. Respiratory: Positive for shortness of breath. Negative for cough. Cardiovascular: Negative. Negative for chest pain, palpitations and leg swelling. Gastrointestinal: Negative. Negative for abdominal pain and vomiting. Endocrine: Positive for polyuria. Genitourinary: Positive for frequency. Negative for dysuria and hematuria. Musculoskeletal: Positive for arthralgias. Negative for back pain. Skin: Negative. Negative for color change and rash. Allergic/Immunologic: Negative. Neurological: Positive for dizziness and headaches. Negative for tremors, seizures, syncope, facial asymmetry, speech difficulty, weakness, light-headedness and numbness. Hematological: Negative. Psychiatric/Behavioral: Negative. All other systems reviewed and are negative. Current Outpatient Medications on File Prior to Visit   Medication Sig   • [DISCONTINUED] hydrocortisone 2.5 % ointment Apply topically 2 (two) times a day   • [DISCONTINUED] methocarbamol (Robaxin-750) 750 mg tablet Take 1 tablet (750 mg total) by mouth every 6 (six) hours as needed for muscle spasms (Patient not taking: Reported on 10/17/2022)   • [DISCONTINUED] vitamin B-12 (CYANOCOBALAMIN) 500 MCG TABS TAKE 1 TABLET BY MOUTH DAILY (Patient not taking: Reported on 10/17/2022)       Objective     /70 (BP Location: Left arm, Patient Position: Sitting, Cuff Size: Standard)   Pulse 99   Temp 97.8 °F (36.6 °C) (Temporal)   Resp 18   Ht 5' 6" (1.676 m)   Wt 95.3 kg (210 lb)   LMP 08/12/2023 (Exact Date)   SpO2 98%   BMI 33.89 kg/m²     Physical Exam  Vitals and nursing note reviewed. Constitutional:       Appearance: She is obese. HENT:      Head: Normocephalic and atraumatic.       Right Ear: Hearing, tympanic membrane, ear canal and external ear normal.      Left Ear: Hearing, tympanic membrane, ear canal and external ear normal.      Nose: Nose normal.      Mouth/Throat: Lips: Pink. Mouth: Mucous membranes are moist.      Pharynx: Oropharynx is clear. Uvula midline. Eyes:      General: Lids are normal. Lids are everted, no foreign bodies appreciated. Vision grossly intact. Gaze aligned appropriately. Extraocular Movements: Extraocular movements intact. Conjunctiva/sclera: Conjunctivae normal.      Pupils: Pupils are equal, round, and reactive to light. Neck:      Trachea: Trachea and phonation normal.   Cardiovascular:      Rate and Rhythm: Normal rate and regular rhythm. Pulses: Normal pulses. Heart sounds: Normal heart sounds. Pulmonary:      Effort: Pulmonary effort is normal.      Breath sounds: Normal breath sounds and air entry. Abdominal:      General: Bowel sounds are normal.      Palpations: Abdomen is soft. Tenderness: There is no abdominal tenderness. Musculoskeletal:         General: Normal range of motion. Cervical back: Full passive range of motion without pain, normal range of motion and neck supple. Skin:     General: Skin is warm and dry. Neurological:      General: No focal deficit present. Mental Status: She is alert and oriented to person, place, and time. Mental status is at baseline. Cranial Nerves: Cranial nerves 2-12 are intact. Sensory: Sensation is intact. Motor: Motor function is intact. No weakness, tremor, atrophy, abnormal muscle tone, seizure activity or pronator drift. Coordination: Coordination is intact. Romberg sign negative. Coordination normal. Finger-Nose-Finger Test and Heel to [facility] Test normal. Rapid alternating movements normal.      Gait: Gait and tandem walk normal.   Psychiatric:         Mood and Affect: Mood normal.         Behavior: Behavior normal.         Thought Content:  Thought content normal.         Judgment: Judgment normal.       Leesa Robison

## 2023-08-30 ENCOUNTER — OFFICE VISIT (OUTPATIENT)
Dept: FAMILY MEDICINE CLINIC | Facility: CLINIC | Age: 43
End: 2023-08-30

## 2023-08-30 VITALS
DIASTOLIC BLOOD PRESSURE: 70 MMHG | TEMPERATURE: 97.8 F | HEIGHT: 66 IN | HEART RATE: 99 BPM | RESPIRATION RATE: 18 BRPM | WEIGHT: 210 LBS | BODY MASS INDEX: 33.75 KG/M2 | OXYGEN SATURATION: 98 % | SYSTOLIC BLOOD PRESSURE: 110 MMHG

## 2023-08-30 DIAGNOSIS — E53.8 VITAMIN B12 DEFICIENCY: ICD-10-CM

## 2023-08-30 DIAGNOSIS — R42 DIZZINESS: Primary | ICD-10-CM

## 2023-08-30 DIAGNOSIS — R35.0 URINARY FREQUENCY: ICD-10-CM

## 2023-08-30 DIAGNOSIS — H91.93 BILATERAL HEARING LOSS, UNSPECIFIED HEARING LOSS TYPE: ICD-10-CM

## 2023-08-30 DIAGNOSIS — E66.9 OBESITY (BMI 30-39.9): ICD-10-CM

## 2023-08-30 DIAGNOSIS — E55.9 VITAMIN D DEFICIENCY: ICD-10-CM

## 2023-08-30 DIAGNOSIS — R06.02 SOB (SHORTNESS OF BREATH): ICD-10-CM

## 2023-08-30 DIAGNOSIS — M54.50 ACUTE LEFT-SIDED LOW BACK PAIN WITHOUT SCIATICA: ICD-10-CM

## 2023-08-30 PROCEDURE — 99214 OFFICE O/P EST MOD 30 MIN: CPT

## 2023-08-30 RX ORDER — MECLIZINE HCL 12.5 MG/1
12.5 TABLET ORAL 3 TIMES DAILY PRN
Qty: 30 TABLET | Refills: 0 | Status: SHIPPED | OUTPATIENT
Start: 2023-08-30

## 2023-08-30 NOTE — ASSESSMENT & PLAN NOTE
- check labs & UA. Do not suspect acute infection given length of symptoms of lack of accompanying  symptoms as per ROS.

## 2023-08-30 NOTE — ASSESSMENT & PLAN NOTE
- SOB only with exertion  - will check labs & EKG. Denies hx of smoking/second hand exposure. No associated cough, wheezing. Does not exercise. Suspect it is deconditioning.

## 2023-08-30 NOTE — ASSESSMENT & PLAN NOTE
Suspect dizziness & headaches are secondary to hearing loss. Will check labs, f/u audiology, trial meclizine.

## 2023-08-31 ENCOUNTER — APPOINTMENT (OUTPATIENT)
Dept: LAB | Facility: HOSPITAL | Age: 43
End: 2023-08-31
Payer: MEDICARE

## 2023-08-31 DIAGNOSIS — E55.9 VITAMIN D DEFICIENCY: ICD-10-CM

## 2023-08-31 DIAGNOSIS — R42 DIZZINESS: ICD-10-CM

## 2023-08-31 DIAGNOSIS — E53.8 VITAMIN B12 DEFICIENCY: ICD-10-CM

## 2023-08-31 DIAGNOSIS — E66.9 OBESITY (BMI 30-39.9): ICD-10-CM

## 2023-08-31 LAB
25(OH)D3 SERPL-MCNC: 22.8 NG/ML (ref 30–100)
ALBUMIN SERPL BCP-MCNC: 4.5 G/DL (ref 3.5–5)
ALP SERPL-CCNC: 52 U/L (ref 34–104)
ALT SERPL W P-5'-P-CCNC: 13 U/L (ref 7–52)
ANION GAP SERPL CALCULATED.3IONS-SCNC: 10 MMOL/L
AST SERPL W P-5'-P-CCNC: 18 U/L (ref 13–39)
BASOPHILS # BLD AUTO: 0.04 THOUSANDS/ÂΜL (ref 0–0.1)
BASOPHILS NFR BLD AUTO: 1 % (ref 0–1)
BILIRUB SERPL-MCNC: 0.74 MG/DL (ref 0.2–1)
BILIRUB UR QL STRIP: NEGATIVE
BUN SERPL-MCNC: 7 MG/DL (ref 5–25)
CALCIUM SERPL-MCNC: 9 MG/DL (ref 8.4–10.2)
CHLORIDE SERPL-SCNC: 102 MMOL/L (ref 96–108)
CLARITY UR: CLEAR
CO2 SERPL-SCNC: 24 MMOL/L (ref 21–32)
COLOR UR: YELLOW
CREAT SERPL-MCNC: 0.74 MG/DL (ref 0.6–1.3)
EOSINOPHIL # BLD AUTO: 0.1 THOUSAND/ÂΜL (ref 0–0.61)
EOSINOPHIL NFR BLD AUTO: 2 % (ref 0–6)
ERYTHROCYTE [DISTWIDTH] IN BLOOD BY AUTOMATED COUNT: 14.5 % (ref 11.6–15.1)
GFR SERPL CREATININE-BSD FRML MDRD: 99 ML/MIN/1.73SQ M
GLUCOSE P FAST SERPL-MCNC: 92 MG/DL (ref 65–99)
GLUCOSE UR STRIP-MCNC: NEGATIVE MG/DL
HCT VFR BLD AUTO: 39 % (ref 34.8–46.1)
HGB BLD-MCNC: 12.1 G/DL (ref 11.5–15.4)
HGB UR QL STRIP.AUTO: NEGATIVE
IMM GRANULOCYTES # BLD AUTO: 0.02 THOUSAND/UL (ref 0–0.2)
IMM GRANULOCYTES NFR BLD AUTO: 0 % (ref 0–2)
KETONES UR STRIP-MCNC: NEGATIVE MG/DL
LEUKOCYTE ESTERASE UR QL STRIP: NEGATIVE
LYMPHOCYTES # BLD AUTO: 1.57 THOUSANDS/ÂΜL (ref 0.6–4.47)
LYMPHOCYTES NFR BLD AUTO: 26 % (ref 14–44)
MCH RBC QN AUTO: 26.4 PG (ref 26.8–34.3)
MCHC RBC AUTO-ENTMCNC: 31 G/DL (ref 31.4–37.4)
MCV RBC AUTO: 85 FL (ref 82–98)
MONOCYTES # BLD AUTO: 0.42 THOUSAND/ÂΜL (ref 0.17–1.22)
MONOCYTES NFR BLD AUTO: 7 % (ref 4–12)
NEUTROPHILS # BLD AUTO: 3.93 THOUSANDS/ÂΜL (ref 1.85–7.62)
NEUTS SEG NFR BLD AUTO: 64 % (ref 43–75)
NITRITE UR QL STRIP: NEGATIVE
NRBC BLD AUTO-RTO: 0 /100 WBCS
PH UR STRIP.AUTO: 6 [PH]
PLATELET # BLD AUTO: 246 THOUSANDS/UL (ref 149–390)
PMV BLD AUTO: 10.2 FL (ref 8.9–12.7)
POTASSIUM SERPL-SCNC: 3.7 MMOL/L (ref 3.5–5.3)
PROT SERPL-MCNC: 7.4 G/DL (ref 6.4–8.4)
PROT UR STRIP-MCNC: NEGATIVE MG/DL
RBC # BLD AUTO: 4.58 MILLION/UL (ref 3.81–5.12)
SODIUM SERPL-SCNC: 136 MMOL/L (ref 135–147)
SP GR UR STRIP.AUTO: 1.02 (ref 1–1.04)
TSH SERPL DL<=0.05 MIU/L-ACNC: 1.74 UIU/ML (ref 0.45–4.5)
UROBILINOGEN UA: NEGATIVE MG/DL
VIT B12 SERPL-MCNC: 312 PG/ML (ref 180–914)
WBC # BLD AUTO: 6.08 THOUSAND/UL (ref 4.31–10.16)

## 2023-08-31 PROCEDURE — 80053 COMPREHEN METABOLIC PANEL: CPT

## 2023-08-31 PROCEDURE — 82306 VITAMIN D 25 HYDROXY: CPT

## 2023-08-31 PROCEDURE — 82607 VITAMIN B-12: CPT

## 2023-08-31 PROCEDURE — 36415 COLL VENOUS BLD VENIPUNCTURE: CPT

## 2023-08-31 PROCEDURE — 85025 COMPLETE CBC W/AUTO DIFF WBC: CPT

## 2023-08-31 PROCEDURE — 84443 ASSAY THYROID STIM HORMONE: CPT

## 2023-09-13 ENCOUNTER — OFFICE VISIT (OUTPATIENT)
Dept: FAMILY MEDICINE CLINIC | Facility: CLINIC | Age: 43
End: 2023-09-13

## 2023-09-13 VITALS
RESPIRATION RATE: 16 BRPM | SYSTOLIC BLOOD PRESSURE: 124 MMHG | DIASTOLIC BLOOD PRESSURE: 80 MMHG | TEMPERATURE: 96.8 F | OXYGEN SATURATION: 98 % | WEIGHT: 209 LBS | HEIGHT: 66 IN | BODY MASS INDEX: 33.59 KG/M2 | HEART RATE: 98 BPM

## 2023-09-13 DIAGNOSIS — R42 DIZZINESS: Primary | ICD-10-CM

## 2023-09-13 DIAGNOSIS — R35.0 URINARY FREQUENCY: ICD-10-CM

## 2023-09-13 PROCEDURE — 99214 OFFICE O/P EST MOD 30 MIN: CPT

## 2023-09-13 NOTE — ASSESSMENT & PLAN NOTE
Body mass index is 33.73 kg/m². The BMI is above normal. Nutrition recommendations include reducing portion sizes, decreasing overall calorie intake, 3-5 servings of fruits/vegetables daily, reducing fast food intake, consuming healthier snacks, decreasing soda and/or juice intake, moderation in carbohydrate intake, increasing intake of lean protein, reducing intake of saturated fat and trans fat and reducing intake of cholesterol. Exercise recommendations include moderate aerobic physical activity for 150 minutes/week.

## 2023-09-13 NOTE — PROGRESS NOTES
Name: Oliva Cortes      : 1980      MRN: [id number]  Encounter Provider: BRITTANY Hess  Encounter Date: 2023   Encounter department: [address] [facility]     1. Dizziness  Assessment & Plan:  -stable  -continue meclizine 12.5 mg PRN      2. BMI 33.0-33.9,adult  Assessment & Plan: Body mass index is 33.73 kg/m². The BMI is above normal. Nutrition recommendations include reducing portion sizes, decreasing overall calorie intake, 3-5 servings of fruits/vegetables daily, reducing fast food intake, consuming healthier snacks, decreasing soda and/or juice intake, moderation in carbohydrate intake, increasing intake of lean protein, reducing intake of saturated fat and trans fat and reducing intake of cholesterol. Exercise recommendations include moderate aerobic physical activity for 150 minutes/week. 3. Urinary frequency  Assessment & Plan:  - Discussed lifestyle management  -Pelvic therapy and obgyn eval    Orders:  -     Ambulatory Referral to Physical Therapy; Future  -     Ambulatory Referral to Obstetrics / Gynecology; Future         Subjective      Matilde Webster is a 37 y.o. female  has a past medical history of Abnormal uterine bleeding, Atypical squamous cells of undetermined significance on cytologic smear of cervix (ASC-US), Bilateral hand pain, Cervical high risk HPV (human papillomavirus) test positive, Hyperlipemia, Hyperlipidemia, and Insomnia. has a past surgical history that includes Tubal ligation.     Patient presents for follow up for dizziness. Patient reports taking meclizine daily has been effective. Patient denies recurrence of dizziness. She has a visit with ENT next month. Urinary frequency  Patient also endorses ongoing urinary frequency. She has had symptoms for 6 months. Patient denies back pain, fever, vaginal discharge, hematuria, burning, urinary incontinence, and pelvic pain.  Patient does not have a history of recurrent UTI. Patient does not have a history of pyelonephritis. UA was ordered last OV which did not show any abnormalities. Review of Systems   Constitutional: Negative. Negative for chills and fever. HENT: Negative. Negative for ear pain and sore throat. Eyes: Negative. Negative for pain and visual disturbance. Respiratory: Negative. Negative for cough and shortness of breath. Cardiovascular: Negative. Negative for chest pain and palpitations. Gastrointestinal: Negative. Negative for abdominal pain and vomiting. Endocrine: Negative. Genitourinary: Positive for frequency. Negative for dysuria and hematuria. Musculoskeletal: Negative. Negative for arthralgias and back pain. Skin: Negative. Negative for color change and rash. Allergic/Immunologic: Negative. Neurological: Negative. Negative for seizures and syncope. Hematological: Negative. Psychiatric/Behavioral: Negative. All other systems reviewed and are negative. Current Outpatient Medications on File Prior to Visit   Medication Sig   • meclizine (ANTIVERT) 12.5 MG tablet Take 1 tablet (12.5 mg total) by mouth 3 (three) times a day as needed for dizziness       Objective     /80 (BP Location: Left arm, Patient Position: Sitting, Cuff Size: Large)   Pulse 98   Temp (!) 96.8 °F (36 °C) (Temporal)   Resp 16   Ht 5' 6" (1.676 m)   Wt 94.8 kg (209 lb)   LMP 09/05/2023 (Exact Date)   SpO2 98%   BMI 33.73 kg/m²     Physical Exam  Constitutional:       Appearance: Normal appearance. She is obese. HENT:      Head: Normocephalic and atraumatic. Right Ear: Tympanic membrane, ear canal and external ear normal.      Left Ear: Tympanic membrane, ear canal and external ear normal.      Nose: Nose normal.      Mouth/Throat:      Mouth: Mucous membranes are moist.   Eyes:      General:         Right eye: No discharge. Left eye: No discharge.    Cardiovascular:      Rate and Rhythm: Normal rate and regular rhythm. Pulses: Normal pulses. Heart sounds: Normal heart sounds. Pulmonary:      Effort: Pulmonary effort is normal.      Breath sounds: Normal breath sounds. Abdominal:      General: Abdomen is flat. Bowel sounds are normal.      Palpations: Abdomen is soft. Musculoskeletal:         General: Normal range of motion. Cervical back: Normal range of motion. Right lower leg: No edema. Left lower leg: No edema. Skin:     General: Skin is warm and dry. Capillary Refill: Capillary refill takes less than 2 seconds. Neurological:      General: No focal deficit present. Mental Status: She is alert and oriented to person, place, and time. Psychiatric:         Mood and Affect: Mood normal.         Behavior: Behavior normal.         Thought Content:  Thought content normal.         Judgment: Judgment normal.       Chandra Vasquez

## 2023-09-19 NOTE — PROGRESS NOTES
Subjective:   Thai Interpeter: [id number] ,  Abi Amaris is a 37 y.o. [id number] female who presents with urinary frequency. She states that she use the restroom during the day approximately 10 times and then uses the bathroom at night 6-7 times. She   Reports this all started approximately 6-7 months ago. She does report that she sometimes leaks a small amount of urine on her way to the bathroom. Prior to 6 months ago she used the restroom about 5 times during the day and maybe only 1-2 times at night. She did not start any new medication at that time although she started meclizine 1 month ago for dizziness. She denies any leakage with coughing, laughing, or sneezing. She denies dysuria, hematuria, odor or cloudiness. Objective:    Vitals: Last menstrual period 09/05/2023, not currently breastfeeding. There is no height or weight on file to calculate BMI. Physical Exam  Vitals reviewed. Constitutional:       Appearance: Normal appearance. Cardiovascular:      Rate and Rhythm: Normal rate. Pulmonary:      Effort: Pulmonary effort is normal.   Musculoskeletal:         General: No swelling or tenderness. Skin:     General: Skin is warm and dry. Neurological:      Mental Status: She is alert and oriented to person, place, and time. Recent Results (from the past 1 hour(s))   POCT urine dip    Collection Time: 09/20/23 10:06 AM   Result Value Ref Range    LEUKOCYTE ESTERASE,UA negative     NITRITE,UA negative     SL AMB POCT UROBILINOGEN 0.2     POCT URINE PROTEIN negative      PH,UA 5.0     BLOOD,UA negative     SPECIFIC GRAVITY,UA 1.015     KETONES,UA negative     BILIRUBIN,UA negative     GLUCOSE, UA negative      COLOR,UA yellow     CLARITY,UA clear      Assessment/Plan:    OAB  -Instructions given on kegel exercises. Education provided regarding bladder irritants.  -Plan to start Mybetriq 25mg nightly, will reassess at annual exam if medication is working.          Alphonso Johansen, MD  9/19/2023  1:38 PM

## 2023-09-20 ENCOUNTER — OFFICE VISIT (OUTPATIENT)
Dept: OBGYN CLINIC | Facility: CLINIC | Age: 43
End: 2023-09-20

## 2023-09-20 VITALS
WEIGHT: 210.4 LBS | HEIGHT: 66 IN | DIASTOLIC BLOOD PRESSURE: 96 MMHG | SYSTOLIC BLOOD PRESSURE: 137 MMHG | HEART RATE: 111 BPM | BODY MASS INDEX: 33.82 KG/M2

## 2023-09-20 DIAGNOSIS — N32.81 OAB (OVERACTIVE BLADDER): Primary | ICD-10-CM

## 2023-09-20 DIAGNOSIS — R35.0 URINARY FREQUENCY: ICD-10-CM

## 2023-09-20 PROCEDURE — 99214 OFFICE O/P EST MOD 30 MIN: CPT | Performed by: OBSTETRICS & GYNECOLOGY

## 2023-09-20 PROCEDURE — 81002 URINALYSIS NONAUTO W/O SCOPE: CPT | Performed by: OBSTETRICS & GYNECOLOGY

## 2023-09-20 RX ORDER — MIRABEGRON 25 MG/1
25 TABLET, FILM COATED, EXTENDED RELEASE ORAL
Qty: 30 TABLET | Refills: 2 | Status: SHIPPED | OUTPATIENT
Start: 2023-09-20 | End: 2023-10-20

## 2023-10-18 ENCOUNTER — TELEPHONE (OUTPATIENT)
Dept: OBGYN CLINIC | Facility: CLINIC | Age: 43
End: 2023-10-18

## 2023-10-18 ENCOUNTER — ANNUAL EXAM (OUTPATIENT)
Dept: OBGYN CLINIC | Facility: CLINIC | Age: 43
End: 2023-10-18

## 2023-10-18 VITALS
HEART RATE: 94 BPM | DIASTOLIC BLOOD PRESSURE: 98 MMHG | BODY MASS INDEX: 33.52 KG/M2 | SYSTOLIC BLOOD PRESSURE: 148 MMHG | HEIGHT: 66 IN | WEIGHT: 208.6 LBS

## 2023-10-18 DIAGNOSIS — Z12.31 ENCOUNTER FOR SCREENING MAMMOGRAM FOR MALIGNANT NEOPLASM OF BREAST: ICD-10-CM

## 2023-10-18 DIAGNOSIS — Z01.419 ENCOUNTER FOR ANNUAL ROUTINE GYNECOLOGICAL EXAMINATION: Primary | ICD-10-CM

## 2023-10-18 DIAGNOSIS — Z13.31 POSITIVE DEPRESSION SCREENING: ICD-10-CM

## 2023-10-18 NOTE — PROGRESS NOTES
Annual Exam    Assessment   1. Encounter for annual routine gynecological examination        2. Encounter for screening mammogram for malignant neoplasm of breast  Mammo screening bilateral w 3d & cad      3. Positive depression screening  Ambulatory Referral to Social Work Care Management Program        well woman       Plan       All questions answered. Breast self exam technique reviewed and patient encouraged to perform self-exam monthly. Contraception: tubal ligation. Discussed healthy lifestyle modifications. Follow up in 1 year. Mammogram.     Patient Instructions   Follow up with Family doctor for elevated BP  Schedule mammogram  Call with needs or concerns  Return for annual GYN exam in 1 year    Subjective      Matilde Webster is a 37 y.o.  female who presents for annual well woman exam. Periods are regular every 28-30 days, lasting 5 days. No intermenstrual bleeding, spotting, or discharge. Last WNL PAP and negative HPV 2021  Last WNL mammogram was 2021  BP was elevated 148/98, pt denies Hx of hypertension, H/A or visual changes, pt left office before BP could be re-checked pt was called regarding need for BP check here or at a pharmacy and call office with reading, pt states she monitors BP at home, states at home while on the phone it was 179/109, planned to call Family doctor to report reading and schedule appointment    Depression Screening Follow-up Plan: Patient's depression screening was positive with a PHQ-2 score of 2. Their PHQ-9 score was 11. Pt denies suicidal thoughts. Referral was provided to social work.       1 partner x 6 years, denies domestic violence  Current contraception: tubal ligation  History of abnormal Pap smear: yes -  ASCUS HPV positive, negative colpo  Family history of uterine or ovarian cancer: no  Regular self breast exam: yes  History of abnormal mammogram: no  Family history of breast cancer: no  History of abnormal lipids: unknown  Menstrual History:  OB History          4    Para   4    Term   4            AB        Living   4         SAB        IAB        Ectopic        Multiple        Live Births   3                Menarche age: 5  Patient's last menstrual period was 10/01/2023 (exact date). The following portions of the patient's history were reviewed and updated as appropriate: allergies, current medications, past family history, past medical history, past social history, past surgical history and problem list.    Review of Systems  Pertinent items are noted in HPI.       Objective      /98   Pulse 94   Ht 5' 6" (1.676 m)   Wt 94.6 kg (208 lb 9.6 oz)   LMP 10/01/2023 (Exact Date)   BMI 33.67 kg/m²     General: alert and oriented, in no acute distress, alert, appears stated age, and cooperative   Heart: NSR   Lungs: CTA bilaterally, WNL respiratory effort, negative cough or SOB   Thyroid: Negative masses palpable   Abdomen: soft, non-tender, without masses or organomegaly palpable   Vulva: normal   Vagina: normal mucosa   Cervix: no cervical motion tenderness and no lesions   Uterus: normal size, non-tender, normal shape and consistency   Adnexa: normal adnexa   Urethra: normal   Breasts: NT,negative masses, discharge, or dimpling

## 2023-10-18 NOTE — PATIENT INSTRUCTIONS
Follow up with Family doctor for elevated BP  Schedule mammogram  Call with needs or concerns  Return for annual GYN exam in 1 year

## 2023-10-19 ENCOUNTER — OFFICE VISIT (OUTPATIENT)
Dept: OTOLARYNGOLOGY | Facility: CLINIC | Age: 43
End: 2023-10-19

## 2023-10-19 ENCOUNTER — OFFICE VISIT (OUTPATIENT)
Dept: AUDIOLOGY | Facility: CLINIC | Age: 43
End: 2023-10-19
Payer: MEDICARE

## 2023-10-19 DIAGNOSIS — H91.93 BILATERAL HEARING LOSS, UNSPECIFIED HEARING LOSS TYPE: ICD-10-CM

## 2023-10-19 DIAGNOSIS — R42 VERTIGO: Primary | ICD-10-CM

## 2023-10-19 DIAGNOSIS — R09.81 NASAL CONGESTION: Primary | ICD-10-CM

## 2023-10-19 DIAGNOSIS — Z01.10 NORMAL HEARING TEST OF BOTH EARS: ICD-10-CM

## 2023-10-19 DIAGNOSIS — R42 DIZZINESS: ICD-10-CM

## 2023-10-19 DIAGNOSIS — H93.8X3 FULLNESS IN EAR, BILATERAL: ICD-10-CM

## 2023-10-19 DIAGNOSIS — H93.13 TINNITUS, BILATERAL: ICD-10-CM

## 2023-10-19 PROCEDURE — 92557 COMPREHENSIVE HEARING TEST: CPT | Performed by: AUDIOLOGIST-HEARING AID FITTER

## 2023-10-19 PROCEDURE — 92567 TYMPANOMETRY: CPT | Performed by: AUDIOLOGIST-HEARING AID FITTER

## 2023-10-19 RX ORDER — FLUTICASONE PROPIONATE 50 MCG
2 SPRAY, SUSPENSION (ML) NASAL DAILY
Qty: 16 G | Refills: 5 | Status: SHIPPED | OUTPATIENT
Start: 2023-10-19

## 2023-10-19 NOTE — PROGRESS NOTES
[facility]ty Phys[facility] Otolaryngology      Otolaryngology -- New Patient Visit    Norberto Cano is a 37 y.o. who presents with a chief complaint of dizziness     HPI:  Norberto Cano is a 37year old that presents to the office with concerns of dizziness which started about 3-4 months ago. She was taking meclizine which is helpful and resolves her symptoms. She has not had an episode since September 2023 and has not taken meclizine. She denies fluctuating hearing loss. During her dizziness episodes she reports tinnitus and fullness in the ears. No prior illness to onset of dizziness. Laying over in bed is not bothersome. She states dizziness starts when she stands up and when she sits up from lying in bed.      No Known Allergies  Past Medical History:   Diagnosis Date    Abnormal uterine bleeding 10/24/2019    Atypical squamous cells of undetermined significance on cytologic smear of cervix (ASC-US) 9/18/2019    + AGC + HPV other positive    Bilateral hand pain 7/17/2018    Cervical high risk HPV (human papillomavirus) test positive 12/11/2018    PAP WNL needs re-PAP and co-testing in 1 year    Hyperlipemia     Hyperlipidemia 10/27/2017    Insomnia 6/3/2020     Past Surgical History:   Procedure Laterality Date    TUBAL LIGATION       Family History   Problem Relation Age of Onset    Hyperlipidemia Mother     Hypertension Mother     No Known Problems Sister     No Known Problems Maternal Grandmother     No Known Problems Paternal Grandmother     No Known Problems Maternal Aunt     No Known Problems Maternal Aunt     No Known Problems Maternal Aunt     No Known Problems Paternal Aunt     No Known Problems Paternal Aunt     No Known Problems Paternal Aunt     Diabetes Family      Current Outpatient Medications on File Prior to Visit   Medication Sig Dispense Refill    meclizine (ANTIVERT) 12.5 MG tablet Take 1 tablet (12.5 mg total) by mouth 3 (three) times a day as needed for dizziness 30 tablet 0    Mirabegron ER (Myrbetriq) 25 MG TB24 Take 25 mg by mouth daily at bedtime 30 tablet 2     Current Facility-Administered Medications on File Prior to Visit   Medication Dose Route Frequency Provider Last Rate Last Admin    cyanocobalamin injection 1,000 mcg  1,000 mcg Intramuscular Q30 Days Kelli Browne PA-C   1,000 mcg at 09/02/20 1000           Results reviewed; images from any scan have been personally reviewed:        Physical exam:    LMP 10/01/2023 (Exact Date)     Physical Exam  Vitals reviewed. Constitutional:       General: She is not in acute distress. Appearance: She is well-developed. HENT:      Head: Normocephalic and atraumatic. Right Ear: Tympanic membrane, ear canal and external ear normal. There is no impacted cerumen. Left Ear: Tympanic membrane, ear canal and external ear normal. There is no impacted cerumen. Nose: Nose normal. No congestion. Mouth/Throat:      Mouth: Mucous membranes are moist.      Pharynx: Oropharynx is clear. No oropharyngeal exudate. Eyes:      General:         Right eye: No discharge. Left eye: No discharge. Extraocular Movements: Extraocular movements intact. Conjunctiva/sclera: Conjunctivae normal.      Pupils: Pupils are equal, round, and reactive to light. Pulmonary:      Effort: Pulmonary effort is normal. No respiratory distress. Breath sounds: Normal breath sounds. Musculoskeletal:      Cervical back: Normal range of motion and neck supple. Neurological:      Mental Status: She is alert. Psychiatric:         Mood and Affect: Mood normal.         Procedures      Assessment:   1. Nasal congestion  fluticasone (FLONASE) 50 mcg/act nasal spray      2. Bilateral hearing loss, unspecified hearing loss type  Ambulatory Referral to Audiology      3. Dizziness            Orders  No orders of the defined types were placed in this encounter. Discussion/Plan:    1. Audiogram ordered and personally interpreted. Normal hearing across all frequencies. Type A tymps b/l. Excellent word recognition. Ears well appearing bilaterally on otoscopic exam. TM's intact. No infection, effusion, perforation, or retraction. [employer] negative b/l   I am unsure as to the cause of the dizziness, but we discussed that there are many systems that control the balance including ears, eyes, brain, heart and joints. She is not experiencing vertigo for about a month now. Would like to observe at this time. Also concerns of nasal congestion which I recommend Flonase at this time. Follow up in 3 months. Dictation software was used to dictate this note. It may contain errors with dictating incorrect words/spelling. Please contact provider directly for any questions. Thank you for allowing me to participate in the care of your patient.

## 2023-10-20 ENCOUNTER — PATIENT OUTREACH (OUTPATIENT)
Dept: OBGYN CLINIC | Facility: CLINIC | Age: 43
End: 2023-10-20

## 2023-10-20 NOTE — PROGRESS NOTES
JOHAN IBRAHIM received a referral this week from [address] [place] [address] for pts PHQ-9 score of 16, no SI/HI. JOHAN IBRAHIM called the pt today with the [employer] interpretor to follow up and offer [phone] [facility] resources, no answer, VM was left for a return call.

## 2023-10-23 ENCOUNTER — OFFICE VISIT (OUTPATIENT)
Dept: FAMILY MEDICINE CLINIC | Facility: CLINIC | Age: 43
End: 2023-10-23

## 2023-10-23 VITALS
HEIGHT: 66 IN | DIASTOLIC BLOOD PRESSURE: 86 MMHG | HEART RATE: 94 BPM | RESPIRATION RATE: 16 BRPM | BODY MASS INDEX: 33.75 KG/M2 | TEMPERATURE: 98.9 F | SYSTOLIC BLOOD PRESSURE: 120 MMHG | WEIGHT: 210 LBS | OXYGEN SATURATION: 98 %

## 2023-10-23 DIAGNOSIS — R42 DIZZINESS: Primary | ICD-10-CM

## 2023-10-23 PROBLEM — R35.0 URINARY FREQUENCY: Status: RESOLVED | Noted: 2023-08-30 | Resolved: 2023-10-23

## 2023-10-23 PROCEDURE — 99213 OFFICE O/P EST LOW 20 MIN: CPT

## 2023-10-23 RX ORDER — MECLIZINE HCL 12.5 MG/1
12.5 TABLET ORAL 3 TIMES DAILY PRN
Qty: 90 TABLET | Refills: 0 | Status: SHIPPED | OUTPATIENT
Start: 2023-10-23

## 2023-10-23 NOTE — ASSESSMENT & PLAN NOTE
Body mass index is 33.89 kg/m². The BMI is above normal. Nutrition recommendations include reducing portion sizes, decreasing overall calorie intake, 3-5 servings of fruits/vegetables daily, reducing fast food intake, consuming healthier snacks, decreasing soda and/or juice intake, moderation in carbohydrate intake, increasing intake of lean protein, reducing intake of saturated fat and trans fat and reducing intake of cholesterol. Exercise recommendations include moderate aerobic physical activity for 150 minutes/week.

## 2023-10-23 NOTE — PROGRESS NOTES
Name: Yamilet Odonnell      : 1980      MRN: [id number]  Encounter Provider: BRITTANY Adkins  Encounter Date: 10/23/2023   Encounter department: [facility]     1. Dizziness  Assessment & Plan:  - resolved for over a month. Meclizine was helping when used prn. Continue meclizine prn    Orders:  -     meclizine (ANTIVERT) 12.5 MG tablet; Take 1 tablet (12.5 mg total) by mouth 3 (three) times a day as needed for dizziness    2. BMI 33.0-33.9,adult  Assessment & Plan: Body mass index is 33.89 kg/m². The BMI is above normal. Nutrition recommendations include reducing portion sizes, decreasing overall calorie intake, 3-5 servings of fruits/vegetables daily, reducing fast food intake, consuming healthier snacks, decreasing soda and/or juice intake, moderation in carbohydrate intake, increasing intake of lean protein, reducing intake of saturated fat and trans fat and reducing intake of cholesterol. Exercise recommendations include moderate aerobic physical activity for 150 minutes/week. Traci Kingsley is a 37 y.o. female  has a past medical history of Abnormal uterine bleeding, Atypical squamous cells of undetermined significance on cytologic smear of cervix (ASC-US), Bilateral hand pain, Cervical high risk HPV (human papillomavirus) test positive, Hyperlipemia, Hyperlipidemia, and Insomnia. has a past surgical history that includes Tubal ligation. Patient is a 37 y.o. female who presents for evaluation for elevated high blood pressure during her OBGYN visit on 10/18/23. Patient denies checking home blood pressure. Patient denies: blurred vision, chest pain, dyspnea, headache, neck aches, orthopnea, palpitations, paroxysmal nocturnal dyspnea, peripheral edema, pulsating in the ears, and tiredness/fatigue. Review of Systems   Constitutional: Negative. Negative for chills and fever.    HENT: Negative. Negative for ear pain and sore throat. Eyes: Negative. Negative for pain and visual disturbance. Respiratory: Negative. Negative for cough and shortness of breath. Cardiovascular: Negative. Negative for chest pain and palpitations. Gastrointestinal: Negative. Negative for abdominal pain and vomiting. Endocrine: Negative. Genitourinary: Negative. Negative for dysuria and hematuria. Musculoskeletal: Negative. Negative for arthralgias and back pain. Skin: Negative. Negative for color change and rash. Allergic/Immunologic: Negative. Neurological: Negative. Negative for seizures and syncope. Hematological: Negative. Psychiatric/Behavioral: Negative. All other systems reviewed and are negative. Current Outpatient Medications on File Prior to Visit   Medication Sig   • fluticasone (FLONASE) 50 mcg/act nasal spray 2 sprays into each nostril daily   • Mirabegron ER (Myrbetriq) 25 MG TB24 Take 25 mg by mouth daily at bedtime   • [DISCONTINUED] meclizine (ANTIVERT) 12.5 MG tablet Take 1 tablet (12.5 mg total) by mouth 3 (three) times a day as needed for dizziness       Objective     /86 (BP Location: Right arm, Patient Position: Sitting, Cuff Size: Large)   Pulse 94   Temp 98.9 °F (37.2 °C) (Temporal)   Resp 16   Ht 5' 6" (1.676 m)   Wt 95.3 kg (210 lb)   LMP 10/01/2023 (Exact Date)   SpO2 98%   Breastfeeding No   BMI 33.89 kg/m²     Physical Exam  Constitutional:       Appearance: Normal appearance. She is obese. HENT:      Head: Normocephalic and atraumatic. Right Ear: Tympanic membrane, ear canal and external ear normal.      Left Ear: Tympanic membrane, ear canal and external ear normal.      Nose: Nose normal.      Mouth/Throat:      Mouth: Mucous membranes are moist.   Eyes:      General:         Right eye: No discharge. Left eye: No discharge. Cardiovascular:      Rate and Rhythm: Normal rate and regular rhythm.       Pulses: Normal pulses. Heart sounds: Normal heart sounds. Pulmonary:      Effort: Pulmonary effort is normal.      Breath sounds: Normal breath sounds. Abdominal:      General: Abdomen is flat. Bowel sounds are normal.      Palpations: Abdomen is soft. Musculoskeletal:         General: Normal range of motion. Cervical back: Normal range of motion. Right lower leg: No edema. Left lower leg: No edema. Skin:     General: Skin is warm and dry. Capillary Refill: Capillary refill takes less than 2 seconds. Neurological:      General: No focal deficit present. Mental Status: She is alert and oriented to person, place, and time. Psychiatric:         Mood and Affect: Mood normal.         Behavior: Behavior normal.         Thought Content:  Thought content normal.         Judgment: Judgment normal.       Coty Knight

## 2023-11-22 ENCOUNTER — TELEPHONE (OUTPATIENT)
Dept: FAMILY MEDICINE CLINIC | Facility: CLINIC | Age: 43
End: 2023-11-22

## 2023-11-22 NOTE — TELEPHONE ENCOUNTER
Pt left voicemail requesting a call to schedule appt. I called pt to schedule. Pt stated no longer needed appt.

## 2023-12-01 ENCOUNTER — PATIENT OUTREACH (OUTPATIENT)
Dept: OBGYN CLINIC | Facility: CLINIC | Age: 43
End: 2023-12-01

## 2023-12-04 NOTE — PROGRESS NOTES
JOHAN IBRAHIM was able to get in touch with the patient via "[employer] interpretor, JOHAN IBRAHIM explained self and role, explained the nature of the call. Pt reports that she is not feeling depressed and declines the need for any MH services. JOHAN IBRAHIM encouraged the patient to outreach star if she is ever in need of any resources in the future, pt verbalized understanding. JOHAN Ibrahim will close this encounter at this time.

## 2023-12-13 ENCOUNTER — HOSPITAL ENCOUNTER (OUTPATIENT)
Dept: MAMMOGRAPHY | Facility: MEDICAL CENTER | Age: 43
Discharge: HOME | End: 2023-12-13
Payer: MEDICARE

## 2023-12-13 VITALS — HEIGHT: 66 IN | WEIGHT: 210 LBS | BODY MASS INDEX: 33.75 KG/M2

## 2023-12-13 DIAGNOSIS — Z12.31 ENCOUNTER FOR SCREENING MAMMOGRAM FOR MALIGNANT NEOPLASM OF BREAST: ICD-10-CM

## 2023-12-13 PROCEDURE — 77067 SCR MAMMO BI INCL CAD: CPT

## 2023-12-13 PROCEDURE — 77063 BREAST TOMOSYNTHESIS BI: CPT

## 2024-01-23 ENCOUNTER — HOSPITAL ENCOUNTER (OUTPATIENT)
Dept: ULTRASOUND IMAGING | Facility: CLINIC | Age: 44
Discharge: HOME | End: 2024-01-23
Payer: MEDICARE

## 2024-01-23 ENCOUNTER — HOSPITAL ENCOUNTER (OUTPATIENT)
Dept: MAMMOGRAPHY | Facility: CLINIC | Age: 44
Discharge: HOME | End: 2024-01-23
Payer: MEDICARE

## 2024-01-23 VITALS — WEIGHT: 210 LBS | BODY MASS INDEX: 33.75 KG/M2 | HEIGHT: 66 IN

## 2024-01-23 DIAGNOSIS — R92.8 ABNORMAL MAMMOGRAM: ICD-10-CM

## 2024-01-23 PROCEDURE — 76642 ULTRASOUND BREAST LIMITED: CPT

## 2024-01-23 PROCEDURE — 77065 DX MAMMO INCL CAD UNI: CPT

## 2024-01-23 PROCEDURE — G0279 TOMOSYNTHESIS, MAMMO: HCPCS

## 2024-02-02 ENCOUNTER — TELEPHONE (OUTPATIENT)
Age: 44
End: 2024-02-02

## 2024-02-02 NOTE — TELEPHONE ENCOUNTER
Left a voicemail regarding an appointment that was canceled via Mychart including the phone number of [facility] if she would like to reschedule.

## 2024-04-23 ENCOUNTER — OFFICE VISIT (OUTPATIENT)
Dept: FAMILY MEDICINE CLINIC | Facility: CLINIC | Age: 44
End: 2024-04-23

## 2024-04-23 VITALS
BODY MASS INDEX: 33.27 KG/M2 | HEART RATE: 102 BPM | TEMPERATURE: 97.8 F | SYSTOLIC BLOOD PRESSURE: 126 MMHG | HEIGHT: 66 IN | RESPIRATION RATE: 18 BRPM | WEIGHT: 207 LBS | DIASTOLIC BLOOD PRESSURE: 86 MMHG

## 2024-04-23 DIAGNOSIS — N32.81 OVERACTIVE BLADDER: Primary | ICD-10-CM

## 2024-04-23 DIAGNOSIS — R42 DIZZINESS: ICD-10-CM

## 2024-04-23 DIAGNOSIS — R03.0 ELEVATED BLOOD PRESSURE READING: ICD-10-CM

## 2024-04-23 PROCEDURE — 99214 OFFICE O/P EST MOD 30 MIN: CPT

## 2024-04-23 RX ORDER — OXYBUTYNIN CHLORIDE 5 MG/1
5 TABLET, EXTENDED RELEASE ORAL DAILY
Qty: 90 TABLET | Refills: 0 | Status: SHIPPED | OUTPATIENT
Start: 2024-04-23

## 2024-04-23 NOTE — PROGRESS NOTES
Name: Matilde Rosas      : 1980      MRN: [id number]  Encounter Provider: BRITTANY Narvaez  Encounter Date: 2024   Encounter department: [facility]    Assessment & Plan     1. Overactive bladder  Assessment & Plan:  Reports myrbetriq rx by obgyn did not work.  Trial oxybutynin 5 mg, advised to take 2 pills after 1-2 weeks if 5 mg dose is ineffective. F/u obgyn    Orders:  -     oxybutynin (DITROPAN-XL) 5 mg 24 hr tablet; Take 1 tablet (5 mg total) by mouth daily    2. Dizziness  Assessment & Plan:  resolved      3. Elevated blood pressure reading  Assessment & Plan:  BP Readings from Last 3 Encounters:   24 126/86   10/23/23 120/86   10/18/23 148/98     -reviewed eating a low salt diet (such as the DASH diet), exercising, wt loss               Subjective      Matilde Rosas is a 44 y.o. female  has a past medical history of Abnormal uterine bleeding, Atypical squamous cells of undetermined significance on cytologic smear of cervix (ASC-US), Bilateral hand pain, Cervical high risk HPV (human papillomavirus) test positive, Hyperlipemia, Hyperlipidemia, and Insomnia.  has a past surgical history that includes Tubal ligation.    She presents today for elevated blood pressure reading, OAB, and dizziness f/u.      Review of Systems   Constitutional:  Negative for chills and fever.   HENT:  Negative for ear pain and sore throat.    Eyes:  Negative for pain and visual disturbance.   Respiratory:  Negative for cough and shortness of breath.    Cardiovascular:  Negative for chest pain and palpitations.   Gastrointestinal:  Negative for abdominal pain and vomiting.   Endocrine: Positive for polyuria.   Genitourinary:  Positive for frequency. Negative for dysuria and hematuria.   Musculoskeletal:  Negative for arthralgias and back pain.   Skin:  Negative for color change and rash.   Neurological:  Negative for seizures and syncope.   All other systems  "reviewed and are negative.      Current Outpatient Medications on File Prior to Visit   Medication Sig    fluticasone (FLONASE) 50 mcg/act nasal spray 2 sprays into each nostril daily    meclizine (ANTIVERT) 12.5 MG tablet Take 1 tablet (12.5 mg total) by mouth 3 (three) times a day as needed for dizziness    [DISCONTINUED] Mirabegron ER (Myrbetriq) 25 MG TB24 Take 25 mg by mouth daily at bedtime       Objective     /86 (BP Location: Right arm, Patient Position: Sitting, Cuff Size: Large)   Pulse 102   Temp 97.8 °F (36.6 °C) (Temporal)   Resp 18   Ht 5' 6\" (1.676 m)   Wt 93.9 kg (207 lb)   LMP 03/30/2024 (Exact Date)   BMI 33.41 kg/m²     Physical Exam  Vitals and nursing note reviewed.   HENT:      Head: Normocephalic and atraumatic.      Right Ear: External ear normal.      Left Ear: External ear normal.      Nose: Nose normal.   Eyes:      Extraocular Movements: Extraocular movements intact.      Conjunctiva/sclera: Conjunctivae normal.      Pupils: Pupils are equal, round, and reactive to light.   Cardiovascular:      Rate and Rhythm: Normal rate and regular rhythm.      Pulses: Normal pulses.      Heart sounds: Normal heart sounds.   Pulmonary:      Effort: Pulmonary effort is normal.      Breath sounds: Normal breath sounds.   Musculoskeletal:         General: Normal range of motion.      Cervical back: Normal range of motion.   Skin:     General: Skin is warm and dry.   Neurological:      General: No focal deficit present.      Mental Status: She is alert and oriented to person, place, and time. Mental status is at baseline.   Psychiatric:         Mood and Affect: Mood normal.         Behavior: Behavior normal.         Thought Content: Thought content normal.         Judgment: Judgment normal.       BRITTANY Narvaez    "

## 2024-04-23 NOTE — ASSESSMENT & PLAN NOTE
BP Readings from Last 3 Encounters:   04/23/24 126/86   10/23/23 120/86   10/18/23 148/98     -reviewed eating a low salt diet (such as the DASH diet), exercising, wt loss     Called pt to see if she had any questions before her surgery tomorrow.  Pt without questions.    EFRAÍN

## 2024-04-23 NOTE — ASSESSMENT & PLAN NOTE
Reports myrbetriq rx by obgyn did not work.  Trial oxybutynin 5 mg, advised to take 2 pills after 1-2 weeks if 5 mg dose is ineffective. F/u obgyn

## 2024-07-15 ENCOUNTER — PATIENT OUTREACH (OUTPATIENT)
Facility: HOSPITAL | Age: 44
End: 2024-07-15

## 2024-07-23 ENCOUNTER — HOSPITAL ENCOUNTER (OUTPATIENT)
Dept: MAMMOGRAPHY | Facility: CLINIC | Age: 44
Discharge: HOME | End: 2024-07-23
Payer: OTHER GOVERNMENT

## 2024-07-23 DIAGNOSIS — R92.8 ABNORMAL FINDINGS ON DIAGNOSTIC IMAGING OF BREAST: ICD-10-CM

## 2024-07-23 PROCEDURE — G0279 TOMOSYNTHESIS, MAMMO: HCPCS

## 2024-07-23 PROCEDURE — 77065 DX MAMMO INCL CAD UNI: CPT

## 2024-10-21 ENCOUNTER — OFFICE VISIT (OUTPATIENT)
Dept: FAMILY MEDICINE CLINIC | Facility: CLINIC | Age: 44
End: 2024-10-21

## 2024-10-21 VITALS
BODY MASS INDEX: 33.11 KG/M2 | HEART RATE: 88 BPM | OXYGEN SATURATION: 97 % | SYSTOLIC BLOOD PRESSURE: 130 MMHG | HEIGHT: 66 IN | WEIGHT: 206 LBS | TEMPERATURE: 98 F | DIASTOLIC BLOOD PRESSURE: 90 MMHG | RESPIRATION RATE: 16 BRPM

## 2024-10-21 DIAGNOSIS — R03.0 ELEVATED BLOOD PRESSURE READING: ICD-10-CM

## 2024-10-21 DIAGNOSIS — Z00.00 ANNUAL PHYSICAL EXAM: Primary | ICD-10-CM

## 2024-10-21 DIAGNOSIS — Z12.11 SCREEN FOR COLON CANCER: ICD-10-CM

## 2024-10-21 PROCEDURE — 99213 OFFICE O/P EST LOW 20 MIN: CPT

## 2024-10-21 PROCEDURE — 99396 PREV VISIT EST AGE 40-64: CPT

## 2024-10-21 NOTE — ASSESSMENT & PLAN NOTE
BP Readings from Last 3 Encounters:   10/21/24 130/90   04/23/24 126/86   10/23/23 120/86     -reviewed eating a low salt diet (such as the DASH diet), limiting alcohol, exercising, and wt loss.

## 2024-10-21 NOTE — PATIENT INSTRUCTIONS
"[phone]: [employer]  Patient Education     Routine physical for adults   The Basics   Written by the doctors and editors at [facility]   What is a physical? -- A physical is a routine visit, or \"check-up,\" with your doctor. You might also hear it called a \"wellness visit\" or \"preventive visit.\"  During each visit, the doctor will:   Ask about your physical and mental health   Ask about your habits, behaviors, and lifestyle   Do an exam   Give you vaccines if needed   Talk to you about any medicines you take   Give advice about your health   Answer your questions  Getting regular check-ups is an important part of taking care of your health. It can help your doctor find and treat any problems you have. But it's also important for preventing health problems.  A routine physical is different from a \"sick visit.\" A sick visit is when you see a doctor because of a health concern or problem. Since physicals are scheduled ahead of time, you can think about what you want to ask the doctor.  How often should I get a physical? -- It depends on your age and health. In general, for people age 21 years and older:   If you are younger than 50 years, you might be able to get a physical every 3 years.   If you are 50 years or older, your doctor might recommend a physical every year.  If you have an ongoing health condition, like diabetes or high blood pressure, your doctor will probably want to see you more often.  What happens during a physical? -- In general, each visit will include:   Physical exam - The doctor or nurse will check your height, weight, heart rate, and blood pressure. They will also look at your eyes and ears. They will ask about how you are feeling and whether you have any symptoms that bother you.   Medicines - It's a good idea to bring a list of all the medicines you take to each doctor visit. Your doctor will talk to you about your medicines and answer any questions. Tell them if you are having any side " "effects that bother you. You should also tell them if you are having trouble paying for any of your medicines.   Habits and behaviors - This includes:   Your diet   Your exercise habits   Whether you smoke, drink alcohol, or use drugs   Whether you are sexually active   Whether you feel safe at home  Your doctor will talk to you about things you can do to improve your health and lower your risk of health problems. They will also offer help and support. For example, if you want to quit smoking, they can give you advice and might prescribe medicines. If you want to improve your diet or get more physical activity, they can help you with this, too.   Lab tests, if needed - The tests you get will depend on your age and situation. For example, your doctor might want to check your:   Cholesterol   Blood sugar   Iron level   Vaccines - The recommended vaccines will depend on your age, health, and what vaccines you already had. Vaccines are very important because they can prevent certain serious or deadly infections.   Discussion of screening - \"Screening\" means checking for diseases or other health problems before they cause symptoms. Your doctor can recommend screening based on your age, risk, and preferences. This might include tests to check for:   Cancer, such as breast, prostate, cervical, ovarian, colorectal, prostate, lung, or skin cancer   Sexually transmitted infections, such as chlamydia and gonorrhea   Mental health conditions like depression and anxiety  Your doctor will talk to you about the different types of screening tests. They can help you decide which screenings to have. They can also explain what the results might mean.   Answering questions - The physical is a good time to ask the doctor or nurse questions about your health. If needed, they can refer you to other doctors or specialists, too.  Adults older than 65 years often need other care, too. As you get older, your doctor will talk to you about:   " How to prevent falling at home   Hearing or vision tests   Memory testing   How to take your medicines safely   Making sure that you have the help and support you need at home  All topics are updated as new evidence becomes available and our peer review process is complete.  This topic retrieved from [employer] on: May 02, 2024.  Topic 057414 Version 1.0  Release: 32.4.3 - C32.122  © 2024 UpToDate, Inc. and/or its affiliates. All rights reserved.  Consumer Information Use and Disclaimer   Disclaimer: This generalized information is a limited summary of diagnosis, treatment, and/or medication information. It is not meant to be comprehensive and should be used as a tool to help the user understand and/or assess potential diagnostic and treatment options. It does NOT include all information about conditions, treatments, medications, side effects, or risks that may apply to a specific patient. It is not intended to be medical advice or a substitute for the medical advice, diagnosis, or treatment of a health care provider based on the health care provider's examination and assessment of a patient's specific and unique circumstances. Patients must speak with a health care provider for complete information about their health, medical questions, and treatment options, including any risks or benefits regarding use of medications. This information does not endorse any treatments or medications as safe, effective, or approved for treating a specific patient. UpToDate, Inc. and its affiliates disclaim any warranty or liability relating to this information or the use thereof.The use of this information is governed by the Terms of Use, available at https://www.wolterskluwer.com/en/know/clinical-effectiveness-terms. 2024© UpToDate, Inc. and its affiliates and/or licensors. All rights reserved.  Copyright   © 2024 UpToDate, Inc. and/or its affiliates. All rights reserved.

## 2024-10-21 NOTE — PROGRESS NOTES
Adult Annual Physical  Name: Matilde Rosas      : 1980      MRN: [id number]  Encounter Provider: BRITTANY Narvaez  Encounter Date: 10/21/2024   Encounter department: [facility]    Assessment & Plan  Annual physical exam         Screen for colon cancer    Orders:    Ambulatory Referral to Gastroenterology; Future    Elevated blood pressure reading  BP Readings from Last 3 Encounters:   10/21/24 130/90   24 126/86   10/23/23 120/86     -reviewed eating a low salt diet (such as the DASH diet), limiting alcohol, exercising, and wt loss.         Immunizations and preventive care screenings were discussed with patient today. Appropriate education was printed on patient's after visit summary.    Counseling:  Dental Health: discussed importance of regular tooth brushing, flossing, and dental visits.  Exercise: the importance of regular exercise/physical activity was discussed. Recommend exercise 3-5 times per week for at least 30 minutes.     BMI Counseling: Body mass index is 33.25 kg/m². The BMI is above normal. Nutrition recommendations include decreasing portion sizes, encouraging healthy choices of fruits and vegetables, decreasing fast food intake, consuming healthier snacks, limiting drinks that contain sugar, moderation in carbohydrate intake, increasing intake of lean protein, reducing intake of saturated and trans fat and reducing intake of cholesterol. Exercise recommendations include moderate physical activity 150 minutes/week. No pharmacotherapy was ordered. Rationale for BMI follow-up plan is due to patient being overweight or obese.     Depression Screening and Follow-up Plan: Patient was screened for depression during today's encounter. They screened negative with a PHQ-2 score of 0.        History of Present Illness     Adult Annual Physical:  Patient presents for annual physical. Reports significant hx of family colon CA, in father, uncle,  "and an aunt. .     Diet and Physical Activity:  - Diet/Nutrition: well balanced diet.  - Exercise: no formal exercise.    Depression Screening:  - PHQ-2 Score: 0    General Health:  - Sleep: sleeps well and 7-8 hours of sleep on average.  - Hearing: normal hearing bilateral ears.  - Vision: goes for regular eye exams and wears glasses.  - Dental: no dental visits for > 1 year and brushes teeth twice daily.    /GYN Health:  - Follows with GYN: yes.   - Menopause: premenopausal.   - Last menstrual cycle: 10/14/2024.   - History of STDs: no    Review of Systems   Constitutional:  Negative for chills and fever.   HENT:  Negative for ear pain and sore throat.    Eyes:  Negative for pain and visual disturbance.   Respiratory:  Negative for cough and shortness of breath.    Cardiovascular:  Negative for chest pain and palpitations.   Gastrointestinal:  Negative for abdominal pain and vomiting.   Genitourinary:  Negative for dysuria and hematuria.   Musculoskeletal:  Negative for arthralgias and back pain.   Skin:  Negative for color change and rash.   Neurological:  Negative for seizures and syncope.   All other systems reviewed and are negative.        Objective     /90 (BP Location: Left arm, Patient Position: Sitting, Cuff Size: Large)   Pulse 88   Temp 98 °F (36.7 °C) (Temporal)   Resp 16   Ht 5' 6\" (1.676 m)   Wt 93.4 kg (206 lb)   LMP 08/10/2024   SpO2 97%   BMI 33.25 kg/m²     Physical Exam  Vitals and nursing note reviewed.   Constitutional:       General: She is not in acute distress.     Appearance: Normal appearance. She is well-developed.   HENT:      Head: Normocephalic and atraumatic.      Right Ear: External ear normal.      Left Ear: External ear normal.      Nose: Nose normal.   Eyes:      Conjunctiva/sclera: Conjunctivae normal.   Cardiovascular:      Rate and Rhythm: Normal rate and regular rhythm.      Pulses: Normal pulses.      Heart sounds: Normal heart sounds. No murmur " heard.  Pulmonary:      Effort: Pulmonary effort is normal. No respiratory distress.      Breath sounds: Normal breath sounds.   Abdominal:      Palpations: Abdomen is soft.      Tenderness: There is no abdominal tenderness.   Musculoskeletal:         General: No swelling. Normal range of motion.      Cervical back: Normal range of motion and neck supple.   Skin:     General: Skin is warm and dry.      Capillary Refill: Capillary refill takes less than 2 seconds.   Neurological:      Mental Status: She is alert and oriented to person, place, and time. Mental status is at baseline.   Psychiatric:         Mood and Affect: Mood normal.         Behavior: Behavior normal.         Thought Content: Thought content normal.         Judgment: Judgment normal.

## 2024-10-23 ENCOUNTER — ANNUAL EXAM (OUTPATIENT)
Dept: OBGYN CLINIC | Facility: CLINIC | Age: 44
End: 2024-10-23

## 2024-10-23 VITALS
BODY MASS INDEX: 32.82 KG/M2 | SYSTOLIC BLOOD PRESSURE: 129 MMHG | HEART RATE: 90 BPM | DIASTOLIC BLOOD PRESSURE: 87 MMHG | HEIGHT: 66 IN | WEIGHT: 204.2 LBS

## 2024-10-23 DIAGNOSIS — Z23 NEED FOR HPV VACCINE: ICD-10-CM

## 2024-10-23 DIAGNOSIS — Z01.419 ENCOUNTER FOR ANNUAL ROUTINE GYNECOLOGICAL EXAMINATION: Primary | ICD-10-CM

## 2024-10-23 DIAGNOSIS — Z71.85 HPV VACCINE COUNSELING: ICD-10-CM

## 2024-10-23 PROCEDURE — 90651 9VHPV VACCINE 2/3 DOSE IM: CPT | Performed by: NURSE PRACTITIONER

## 2024-10-23 PROCEDURE — 90471 IMMUNIZATION ADMIN: CPT | Performed by: NURSE PRACTITIONER

## 2024-10-23 PROCEDURE — 99396 PREV VISIT EST AGE 40-64: CPT | Performed by: NURSE PRACTITIONER

## 2024-10-23 NOTE — PATIENT INSTRUCTIONS
Keep appointment for Diagnostic mammogram scheduled  Call with needs or concerns  Return in 1 year for annual GYN exam  Return in 2&4 months for HPV vaccines  HOPE Line [phone]

## 2024-10-23 NOTE — PROGRESS NOTES
Annual Exam    Assessment   1. Encounter for annual routine gynecological examination        2. Need for HPV vaccine  HPV Vaccine 9 valent IM      3. HPV vaccine counseling          well woman       Plan       All questions answered.  Breast self exam technique reviewed and patient encouraged to perform self-exam monthly.  Contraception: tubal ligation.  Discussed healthy lifestyle modifications.  Follow up in 1 year.  Mammogram.     Patient Instructions   Keep appointment for Diagnostic mammogram scheduled  Call with needs or concerns  Return in 1 year for annual GYN exam  Return in 2&4 months for HPV vaccines  HOPE Line [phone]  Pt verbalized understanding of all discussed.      Subjective      Matilde Rosas is a 44 y.o.  female who presents for annual well woman exam. Periods are regular every 28-30 days, lasting 5 days. No intermenstrual bleeding, spotting, or discharge.  2021 WNL PAP and negative HPV  2024 L breast calcifications on mammogram  1 partner x 7 years, denies domestic violence  Never had HPV vaccines, discussed benefit would like to start vaccines today    Depression Screening Follow-up Plan: Patient's depression screening was positive with a PHQ-2 score of 0. Their PHQ-9 score was 0. Patient with underlying depression and was advised to continue current medications as prescribed.    BMI Counseling: Body mass index is 32.96 kg/m². The BMI is above normal. Nutrition recommendations include reducing portion sizes, decreasing overall calorie intake, 3-5 servings of fruits/vegetables daily, consuming healthier snacks, moderation in carbohydrate intake, increasing intake of lean protein, and reducing intake of saturated fat and trans fat. Exercise recommendations include moderate aerobic physical activity for 150 minutes/week.        Current contraception: tubal ligation  History of abnormal Pap smear: no  Family history of uterine or ovarian cancer: no  Regular self breast  "exam: yes  History of abnormal mammogram: L breast calcifications  Family history of breast cancer: yes - Aunt HOPE Line provided   History of abnormal lipids: unknown  Menstrual History:  OB History          4    Para   4    Term   4       0    AB   0    Living   4         SAB   0    IAB   0    Ectopic   0    Multiple   0    Live Births   4                Menarche age: 9  Patient's last menstrual period was 10/10/2024 (exact date).       The following portions of the patient's history were reviewed and updated as appropriate: allergies, current medications, past family history, past medical history, past social history, past surgical history and problem list.    Review of Systems  Pertinent items are noted in HPI.      Objective      /87 (BP Location: Left arm, Patient Position: Sitting, Cuff Size: Standard)   Pulse 90   Ht 5' 6\" (1.676 m)   Wt 92.6 kg (204 lb 3.2 oz)   LMP 10/10/2024 (Exact Date)   BMI 32.96 kg/m²     General: alert and oriented, in no acute distress, alert, appears stated age, and cooperative   Heart: NSR   Lungs: clear to auscultation bilaterally, WNL respiratory effort, negative cough or SOB   Thyroid: Negative masses palpable   Abdomen: soft, non-tender, without masses or organomegaly palpable   Vulva: normal   Vagina: normal mucosa   Cervix: no cervical motion tenderness and no lesions   Uterus: normal size, non-tender, normal shape and consistency   Adnexa: normal adnexa   Urethra: normal   Breasts: NT,negative masses palpable, negative discharge, or dimpling             "

## 2024-11-20 PROBLEM — Z12.11 SCREEN FOR COLON CANCER: Status: RESOLVED | Noted: 2018-12-05 | Resolved: 2024-11-20

## 2024-12-02 ENCOUNTER — OFFICE VISIT (OUTPATIENT)
Dept: GASTROENTEROLOGY | Facility: CLINIC | Age: 44
End: 2024-12-02

## 2024-12-02 VITALS
TEMPERATURE: 97.4 F | DIASTOLIC BLOOD PRESSURE: 103 MMHG | HEIGHT: 66 IN | BODY MASS INDEX: 33.51 KG/M2 | OXYGEN SATURATION: 97 % | WEIGHT: 208.5 LBS | HEART RATE: 105 BPM | SYSTOLIC BLOOD PRESSURE: 155 MMHG

## 2024-12-02 DIAGNOSIS — Z12.11 SCREEN FOR COLON CANCER: ICD-10-CM

## 2024-12-02 PROCEDURE — 99243 OFF/OP CNSLTJ NEW/EST LOW 30: CPT | Performed by: INTERNAL MEDICINE

## 2024-12-02 RX ORDER — BIOTIN 10000 MCG
CAPSULE ORAL
COMMUNITY

## 2024-12-02 RX ORDER — POLYETHYLENE GLYCOL 3350, SODIUM SULFATE ANHYDROUS, SODIUM BICARBONATE, SODIUM CHLORIDE, POTASSIUM CHLORIDE 236; 22.74; 6.74; 5.86; 2.97 G/4L; G/4L; G/4L; G/4L; G/4L
4000 POWDER, FOR SOLUTION ORAL ONCE
Qty: 4000 ML | Refills: 0 | Status: SHIPPED | OUTPATIENT
Start: 2024-12-02 | End: 2024-12-02

## 2024-12-02 RX ORDER — LANOLIN ALCOHOL/MO/W.PET/CERES
1 CREAM (GRAM) TOPICAL 2 TIMES DAILY
COMMUNITY

## 2024-12-02 NOTE — PROGRESS NOTES
[facility] - Outpatient Consultation  Matilde Rosas 44 y.o. female MRN: [id number]  Encounter: [id number]          ASSESSMENT AND PLAN:    Matilde Rosas is a 44 y.o. female with PMH of obesity, FH of CRC presenting for CRC screening.    1. Screen for colon cancer    - Colonoscopy now given strong FH and age  - Golytely/dulcolax prep    Orders Placed This Encounter   Procedures    Colonoscopy     ______________________________________________________________________    HPI:    Matilde Rosas is a 44 y.o. female with PMH of obesity and FH of CRC presenting for CRC screening. Interview conducted via [place]  ID [id number].     FH of cousin, father's uncle, and father CRC. Father dx at age 69, passed away this April. Pt denies any alarm signs, no unintentional weight loss, no blood in stool, no change in bowel habits, no abdominal pain, no nausea or emesis.       REVIEW OF SYSTEMS:    CONSTITUTIONAL: Denies any fever, chills, rigors, and weight loss.  HEENT: No earache or tinnitus. Denies hearing loss or visual disturbances.  CARDIOVASCULAR: No chest pain or palpitations.   RESPIRATORY: Denies any cough, hemoptysis, shortness of breath or dyspnea on exertion.  GASTROINTESTINAL: As noted in the History of Present Illness.   GENITOURINARY: No problems with urination. Denies any hematuria or dysuria.  NEUROLOGIC: No dizziness or vertigo, denies headaches.   MUSCULOSKELETAL: Denies any muscle or joint pain.   SKIN: Denies skin rashes or itching.   ENDOCRINE: Denies excessive thirst. Denies intolerance to heat or cold.  PSYCHOSOCIAL: Denies depression or anxiety. Denies any recent memory loss.       Historical Information   Past Medical History:   Diagnosis Date    Abnormal uterine bleeding 10/24/2019    Atypical squamous cells of undetermined significance on cytologic smear of cervix (ASC-US) 9/18/2019    + AGC + HPV other positive    Bilateral hand pain  "2018    Cervical high risk HPV (human papillomavirus) test positive 2018    PAP WNL needs re-PAP and co-testing in 1 year    Hyperlipemia     Hyperlipidemia 10/27/2017    Insomnia 6/3/2020     Past Surgical History:   Procedure Laterality Date    TUBAL LIGATION       Social History   Social History     Substance and Sexual Activity   Alcohol Use Yes    Comment: Occasional     Social History     Substance and Sexual Activity   Drug Use Never     Social History     Tobacco Use   Smoking Status Former    Current packs/day: 0.00    Types: Cigarettes    Quit date: 2018    Years since quittin.6    Passive exposure: Past   Smokeless Tobacco Never   Tobacco Comments    pt. reports she quit 5 months ago     Family History   Problem Relation Age of Onset    Hyperlipidemia Mother     Hypertension Mother     Colon cancer Father     No Known Problems Sister     No Known Problems Maternal Grandmother     Cancer Maternal Grandfather     No Known Problems Paternal Grandmother     No Known Problems Maternal Aunt     No Known Problems Maternal Aunt     No Known Problems Maternal Aunt     No Known Problems Paternal Aunt     No Known Problems Paternal Aunt     No Known Problems Paternal Aunt     Diabetes Family     Colon cancer Maternal Uncle     Breast cancer Neg Hx        Meds/Allergies       Current Outpatient Medications:     Biotin (Biotin Extra Strength) 10 MG CAPS    calcium citrate-vitamin D 315 mg-5 mcg tablet    fluticasone (FLONASE) 50 mcg/act nasal spray    meclizine (ANTIVERT) 12.5 MG tablet    oxybutynin (DITROPAN-XL) 5 mg 24 hr tablet    Current Facility-Administered Medications:     cyanocobalamin injection 1,000 mcg, 1,000 mcg, Intramuscular, Q30 Days, 1,000 mcg at 20 1000    No Known Allergies        Objective     Blood pressure (!) 155/103, pulse 105, temperature (!) 97.4 °F (36.3 °C), temperature source Tympanic, height 5' 6\" (1.676 m), weight 94.6 kg (208 lb 8 oz), SpO2 97%, not currently " "breastfeeding.   Body mass index is 33.65 kg/m².  Wt Readings from Last 3 Encounters:   12/02/24 94.6 kg (208 lb 8 oz)   10/23/24 92.6 kg (204 lb 3.2 oz)   10/21/24 93.4 kg (206 lb)        PHYSICAL EXAM:      General Appearance:   Alert, cooperative, no distress   HEENT:   Normocephalic, atraumatic, anicteric.     Neck:  Supple, symmetrical, trachea midline   Lungs:   Respirations unlabored    Heart::   Regular rate and rhythm   Abdomen:   Soft, non-tender, non-distended; no masses, no organomegaly    Neurologic:   Normal   Rectal:   Deferred    Extremities:  No cyanosis, clubbing or edema    Pulses:  2+ and symmetric    Skin:  No jaundice, rashes, or lesions    Lymph nodes:  No palpable cervical lymphadenopathy        Lab Results:         Lab Units 08/31/23  0914   SODIUM mmol/L 136   POTASSIUM mmol/L 3.7   CHLORIDE mmol/L 102   CO2 mmol/L 24   BUN mg/dL 7   CREATININE mg/dL 0.74   CALCIUM mg/dL 9.0            Lab Units 08/31/23  0914   TOTAL PROTEIN g/dL 7.4   ALBUMIN g/dL 4.5   TOTAL BILIRUBIN mg/dL 0.74   AST U/L 18   ALT U/L 13   ALK PHOS U/L 52           Lab Units 08/31/23  0914   WBC Thousand/uL 6.08   HEMOGLOBIN g/dL 12.1   HEMATOCRIT % 39.0   PLATELETS Thousands/uL 246   MCV fL 85   MCH pg 26.4*   MCHC g/dL 31.0*   RDW % 14.5   MPV fL 10.2   NRBC AUTO /100 WBCs 0   SEGS PCT % 64   IMMATURE GRANULOCYTES % % 0   LYMPHO PCT % 26   MONO PCT % 7   EOS PCT % 2   BASOS PCT % 1   TOTAL NEUT ABS Thousands/µL 3.93   IMMATURE GRANULOCYES ABS Thousand/uL 0.02   LYMPHS ABS Thousands/µL 1.57   MONOS ABS Thousand/µL 0.42   EOS ABS Thousand/µL 0.10   BASOS ABS Thousands/µL 0.04       No results for input(s): \"IRON\", \"TRANSFERRIN\", \"TRANSFERSAT\", \"FERRITIN\", \"RETIC\" in the last 76107 hours.    Lab Results   Component Value Date    CRP 18.4 (H) 09/27/2017       Lab Results   Component Value Date    ZSD0KEXXMIGU 1.740 08/31/2023       Radiology Results:   No results found.    Gastroenterological Procedures:       Abdullahi Bhatti " MD  PGY-6 Gastroenterology Fellow  12/2/2024 3:07 PM

## 2024-12-27 ENCOUNTER — CLINICAL SUPPORT (OUTPATIENT)
Dept: OBGYN CLINIC | Facility: CLINIC | Age: 44
End: 2024-12-27

## 2024-12-27 VITALS
BODY MASS INDEX: 32.98 KG/M2 | HEART RATE: 99 BPM | HEIGHT: 66 IN | DIASTOLIC BLOOD PRESSURE: 85 MMHG | SYSTOLIC BLOOD PRESSURE: 122 MMHG | WEIGHT: 205.2 LBS

## 2024-12-27 DIAGNOSIS — Z23 NEED FOR HPV VACCINE: Primary | ICD-10-CM

## 2024-12-27 PROCEDURE — 90651 9VHPV VACCINE 2/3 DOSE IM: CPT

## 2024-12-27 PROCEDURE — 90471 IMMUNIZATION ADMIN: CPT

## 2024-12-27 NOTE — PROGRESS NOTES
HPV given to patient in right arm on 12/27/2024.    NDC: 3706-7683-72  LOT: [id number]  EXP: 08/18/2026

## 2025-01-13 ENCOUNTER — TELEPHONE (OUTPATIENT)
Age: 45
End: 2025-01-13

## 2025-01-23 ENCOUNTER — PATIENT OUTREACH (OUTPATIENT)
Facility: HOSPITAL | Age: 45
End: 2025-01-23

## 2025-01-28 ENCOUNTER — HOSPITAL ENCOUNTER (OUTPATIENT)
Dept: MAMMOGRAPHY | Facility: CLINIC | Age: 45
Discharge: HOME | End: 2025-01-28
Payer: OTHER GOVERNMENT

## 2025-01-28 VITALS — WEIGHT: 205.2 LBS | BODY MASS INDEX: 32.98 KG/M2 | HEIGHT: 66 IN

## 2025-01-28 PROCEDURE — G0279 TOMOSYNTHESIS, MAMMO: HCPCS

## 2025-01-28 PROCEDURE — 77066 DX MAMMO INCL CAD BI: CPT

## 2025-02-27 RX ORDER — SODIUM CHLORIDE, SODIUM LACTATE, POTASSIUM CHLORIDE, CALCIUM CHLORIDE 600; 310; 30; 20 MG/100ML; MG/100ML; MG/100ML; MG/100ML
100 INJECTION, SOLUTION INTRAVENOUS CONTINUOUS
Status: CANCELLED | OUTPATIENT
Start: 2025-02-27

## 2025-02-28 ENCOUNTER — ANESTHESIA (OUTPATIENT)
Dept: GASTROENTEROLOGY | Facility: HOSPITAL | Age: 45
End: 2025-02-28
Payer: COMMERCIAL

## 2025-02-28 ENCOUNTER — ANESTHESIA EVENT (OUTPATIENT)
Dept: GASTROENTEROLOGY | Facility: HOSPITAL | Age: 45
End: 2025-02-28
Payer: COMMERCIAL

## 2025-02-28 ENCOUNTER — HOSPITAL ENCOUNTER (OUTPATIENT)
Dept: GASTROENTEROLOGY | Facility: HOSPITAL | Age: 45
Setting detail: OUTPATIENT SURGERY
End: 2025-02-28
Payer: COMMERCIAL

## 2025-02-28 VITALS
SYSTOLIC BLOOD PRESSURE: 123 MMHG | TEMPERATURE: 97.3 F | DIASTOLIC BLOOD PRESSURE: 76 MMHG | RESPIRATION RATE: 18 BRPM | HEART RATE: 76 BPM | OXYGEN SATURATION: 100 %

## 2025-02-28 DIAGNOSIS — Z12.11 SCREEN FOR COLON CANCER: ICD-10-CM

## 2025-02-28 LAB
EXT PREGNANCY TEST URINE: NEGATIVE
EXT. CONTROL: NORMAL

## 2025-02-28 PROCEDURE — 81025 URINE PREGNANCY TEST: CPT | Performed by: STUDENT IN AN ORGANIZED HEALTH CARE EDUCATION/TRAINING PROGRAM

## 2025-02-28 PROCEDURE — G0121 COLON CA SCRN NOT HI RSK IND: HCPCS | Performed by: INTERNAL MEDICINE

## 2025-02-28 RX ORDER — LIDOCAINE HYDROCHLORIDE 10 MG/ML
INJECTION, SOLUTION EPIDURAL; INFILTRATION; INTRACAUDAL; PERINEURAL AS NEEDED
Status: DISCONTINUED | OUTPATIENT
Start: 2025-02-28 | End: 2025-02-28

## 2025-02-28 RX ORDER — PROPOFOL 10 MG/ML
INJECTION, EMULSION INTRAVENOUS AS NEEDED
Status: DISCONTINUED | OUTPATIENT
Start: 2025-02-28 | End: 2025-02-28

## 2025-02-28 RX ORDER — SODIUM CHLORIDE, SODIUM LACTATE, POTASSIUM CHLORIDE, CALCIUM CHLORIDE 600; 310; 30; 20 MG/100ML; MG/100ML; MG/100ML; MG/100ML
100 INJECTION, SOLUTION INTRAVENOUS CONTINUOUS
Status: DISCONTINUED | OUTPATIENT
Start: 2025-02-28 | End: 2025-03-04 | Stop reason: HOSPADM

## 2025-02-28 RX ADMIN — PROPOFOL 20 MG: 10 INJECTION, EMULSION INTRAVENOUS at 10:48

## 2025-02-28 RX ADMIN — LIDOCAINE HYDROCHLORIDE 50 MG: 10 INJECTION, SOLUTION EPIDURAL; INFILTRATION; INTRACAUDAL at 10:47

## 2025-02-28 RX ADMIN — PROPOFOL 130 MG: 10 INJECTION, EMULSION INTRAVENOUS at 10:47

## 2025-02-28 RX ADMIN — PROPOFOL 30 MG: 10 INJECTION, EMULSION INTRAVENOUS at 10:50

## 2025-02-28 RX ADMIN — Medication 40 MG: at 10:52

## 2025-02-28 RX ADMIN — PROPOFOL 20 MG: 10 INJECTION, EMULSION INTRAVENOUS at 10:53

## 2025-02-28 RX ADMIN — SODIUM CHLORIDE, SODIUM LACTATE, POTASSIUM CHLORIDE, AND CALCIUM CHLORIDE 100 ML/HR: .6; .31; .03; .02 INJECTION, SOLUTION INTRAVENOUS at 09:20

## 2025-02-28 NOTE — ANESTHESIA PREPROCEDURE EVALUATION
Procedure:  COLONOSCOPY    Relevant Problems   ANESTHESIA (within normal limits)      MUSCULOSKELETAL   (+) Acute left-sided low back pain without sciatica      PULMONARY   (+) SOB (shortness of breath)        Physical Exam    Airway    Mallampati score: II  TM Distance: >3 FB  Neck ROM: full     Dental       Cardiovascular  Rate: normal    Pulmonary  Pulmonary exam normal     Other Findings  Per pt denies anything remaining that is loose or removeablepost-pubertal.      Anesthesia Plan  ASA Score- 2     Anesthesia Type- IV sedation with anesthesia with ASA Monitors.         Additional Monitors:     Airway Plan:            Plan Factors-Exercise tolerance (METS): >4 METS.    Chart reviewed.    Patient summary reviewed.    Patient is not a current smoker.              Induction- intravenous.    Postoperative Plan-         Informed Consent- Anesthetic plan and risks discussed with patient.  I personally reviewed this patient with the CRNA. Discussed and agreed on the Anesthesia Plan with the CRNA..      NPO Status:  Vitals Value Taken Time   Date of last liquid 02/27/25 02/28/25 0911   Time of last liquid 1700 02/28/25 0911   Date of last solid 02/26/25 02/28/25 0911   Time of last solid 1900 02/28/25 0911

## 2025-02-28 NOTE — ANESTHESIA POSTPROCEDURE EVALUATION
Post-Op Assessment Note    CV Status:  Stable    Pain management: satisfactory to patient       Mental Status:  Alert and awake   Hydration Status:  Stable   PONV Controlled:  None   Airway Patency:  Patent     Post Op Vitals Reviewed: Yes    No anethesia notable event occurred.    Staff: CRNA           Last Filed PACU Vitals:  Vitals Value Taken Time   Temp 97.2    Pulse 78    /78    Resp 14    SpO2 99

## 2025-02-28 NOTE — H&P
History and Physical -  [facility]  Matilde Rosas 44 y.o. female MRN: [id number]                  HPI: Matilde Rosas is a 44 y.o. year old female who presents for colonoscopy due to family history of colon cancer (father, paternal uncle, and cousin). No prior colonoscopy. Not currently on AP/AC.       REVIEW OF SYSTEMS: Per the HPI, and otherwise unremarkable.    Historical Information   Past Medical History:   Diagnosis Date    Abnormal uterine bleeding 10/24/2019    Atypical squamous cells of undetermined significance on cytologic smear of cervix (ASC-US) 2019    + AGC + HPV other positive    Bilateral hand pain 2018    Cervical high risk HPV (human papillomavirus) test positive 2018    PAP WNL needs re-PAP and co-testing in 1 year    Hyperlipemia     Hyperlipidemia 10/27/2017    Insomnia 6/3/2020     Past Surgical History:   Procedure Laterality Date    TUBAL LIGATION       Social History   Social History     Substance and Sexual Activity   Alcohol Use Yes    Comment: Occasional     Social History     Substance and Sexual Activity   Drug Use Never     Social History     Tobacco Use   Smoking Status Former    Current packs/day: 0.00    Types: Cigarettes    Quit date: 2018    Years since quittin.8    Passive exposure: Past   Smokeless Tobacco Never   Tobacco Comments    pt. reports she quit 5 months ago     Family History   Problem Relation Age of Onset    Hyperlipidemia Mother     Hypertension Mother     Colon cancer Father     No Known Problems Sister     Diabetes Maternal Grandmother     Cancer Maternal Grandfather     No Known Problems Paternal Grandmother     No Known Problems Paternal Grandfather     No Known Problems Maternal Aunt     No Known Problems Maternal Aunt     No Known Problems Maternal Aunt     Colon cancer Maternal Uncle     No Known Problems Paternal Aunt     No Known Problems Paternal Aunt     No Known Problems Paternal Aunt     Breast  cancer Neg Hx     Breast cancer additional onset Neg Hx     Endometrial cancer Neg Hx     BRCA2 Positive Neg Hx     BRCA2 Negative Neg Hx     BRCA1 Positive Neg Hx     BRCA1 Negative Neg Hx     BRCA 1/2 Neg Hx     Ovarian cancer Neg Hx        Meds/Allergies       Current Outpatient Medications:     Biotin (Biotin Extra Strength) 10 MG CAPS    calcium citrate-vitamin D 315 mg-5 mcg tablet    fluticasone (FLONASE) 50 mcg/act nasal spray    meclizine (ANTIVERT) 12.5 MG tablet    polyethylene glycol (Golytely) 4000 mL solution    Current Facility-Administered Medications:     cyanocobalamin injection 1,000 mcg, 1,000 mcg, Intramuscular, Q30 Days, 1,000 mcg at 09/02/20 1000    lactated ringers infusion, 100 mL/hr, Intravenous, Continuous, 100 mL/hr at 02/28/25 0920    No Known Allergies    Objective     /79   Pulse 88   Temp 98.4 °F (36.9 °C) (Temporal)   Resp 18   SpO2 97%       PHYSICAL EXAM    Gen: NAD  Head: NCAT  CV: RRR  CHEST: Clear  ABD: soft, NT/ND  EXT: no edema      ASSESSMENT/PLAN:  This is a 44 y.o. year old female here for colonoscopy, and she is stable and optimized for her procedure.

## 2025-03-04 DIAGNOSIS — Z00.6 ENCOUNTER FOR EXAMINATION FOR NORMAL COMPARISON OR CONTROL IN CLINICAL RESEARCH PROGRAM: ICD-10-CM

## 2025-03-06 ENCOUNTER — APPOINTMENT (OUTPATIENT)
Dept: LAB | Facility: CLINIC | Age: 45
End: 2025-03-06

## 2025-03-06 DIAGNOSIS — Z00.6 ENCOUNTER FOR EXAMINATION FOR NORMAL COMPARISON OR CONTROL IN CLINICAL RESEARCH PROGRAM: ICD-10-CM

## 2025-03-06 PROCEDURE — 36415 COLL VENOUS BLD VENIPUNCTURE: CPT

## 2025-03-25 LAB
APOB+LDLR+PCSK9 GENE MUT ANL BLD/T: NOT DETECTED
BRCA1+BRCA2 DEL+DUP + FULL MUT ANL BLD/T: NOT DETECTED
MLH1+MSH2+MSH6+PMS2 GN DEL+DUP+FUL M: NOT DETECTED

## 2025-04-14 ENCOUNTER — TELEPHONE (OUTPATIENT)
Dept: OBGYN CLINIC | Facility: CLINIC | Age: 45
End: 2025-04-14

## 2025-04-14 DIAGNOSIS — N92.6 IRREGULAR MENSES: Primary | ICD-10-CM

## 2025-04-14 NOTE — TELEPHONE ENCOUNTER
Informed patient that a pelvic ultrasound was ordered for her to do to see why she is having the bleeding. Telephone number given for central scheduling so she can make appt. Also told pt to call back after making the appointment so we can schedule her with the doctor to discuss the results. Pt verbalized understanding.

## 2025-04-15 ENCOUNTER — HOSPITAL ENCOUNTER (OUTPATIENT)
Dept: ULTRASOUND IMAGING | Facility: HOSPITAL | Age: 45
Discharge: HOME | End: 2025-04-15
Attending: NURSE PRACTITIONER
Payer: COMMERCIAL

## 2025-04-15 DIAGNOSIS — N92.6 IRREGULAR MENSES: ICD-10-CM

## 2025-04-15 PROCEDURE — 76830 TRANSVAGINAL US NON-OB: CPT

## 2025-04-15 PROCEDURE — 76856 US EXAM PELVIC COMPLETE: CPT

## 2025-04-24 ENCOUNTER — OFFICE VISIT (OUTPATIENT)
Dept: FAMILY MEDICINE CLINIC | Facility: CLINIC | Age: 45
End: 2025-04-24

## 2025-04-24 VITALS
RESPIRATION RATE: 18 BRPM | HEIGHT: 66 IN | DIASTOLIC BLOOD PRESSURE: 84 MMHG | TEMPERATURE: 97.8 F | WEIGHT: 214 LBS | SYSTOLIC BLOOD PRESSURE: 134 MMHG | HEART RATE: 98 BPM | OXYGEN SATURATION: 97 % | BODY MASS INDEX: 34.39 KG/M2

## 2025-04-24 DIAGNOSIS — E66.9 OBESITY (BMI 30-39.9): ICD-10-CM

## 2025-04-24 DIAGNOSIS — E55.9 VITAMIN D DEFICIENCY: ICD-10-CM

## 2025-04-24 DIAGNOSIS — R10.13 EPIGASTRIC PAIN: Primary | ICD-10-CM

## 2025-04-24 DIAGNOSIS — R06.02 SHORTNESS OF BREATH: ICD-10-CM

## 2025-04-24 DIAGNOSIS — R00.2 PALPITATIONS: ICD-10-CM

## 2025-04-24 PROCEDURE — 99214 OFFICE O/P EST MOD 30 MIN: CPT

## 2025-04-24 RX ORDER — FAMOTIDINE 20 MG/1
20 TABLET, FILM COATED ORAL 2 TIMES DAILY
Qty: 180 TABLET | Refills: 3 | Status: SHIPPED | OUTPATIENT
Start: 2025-04-24 | End: 2026-04-19

## 2025-04-24 NOTE — PROGRESS NOTES
Name: Matilde Rosas      : 1980      MRN: [id number]  Encounter Provider: BRITTANY Narvaez  Encounter Date: 2025   Encounter department: [facility]  :  Assessment & Plan  Epigastric pain  Trial pepcid  Orders:    famotidine (PEPCID) 20 mg tablet; Take 1 tablet (20 mg total) by mouth 2 (two) times a day    Obesity (BMI 30-39.9)      Orders:    Comprehensive metabolic panel; Future    CBC and differential; Future    TSH, 3rd generation with Free T4 reflex; Future    Lipid panel; Future    Hemoglobin A1C; Future    Vitamin D deficiency    Orders:    Vitamin D 25 hydroxy; Future    Palpitations  Check labs & EKG  Orders:    ECG 12 lead; Future    Iron Panel (Includes Ferritin, Iron Sat%, Iron, and TIBC); Future    Shortness of breath  See palpitations a/p  Orders:    Comprehensive metabolic panel; Future    CBC and differential; Future    TSH, 3rd generation with Free T4 reflex; Future    ECG 12 lead; Future    Iron Panel (Includes Ferritin, Iron Sat%, Iron, and TIBC); Future           History of Present Illness   Matilde Rosas is a 45 y.o. female  has a past medical history of Abnormal uterine bleeding, Atypical squamous cells of undetermined significance on cytologic smear of cervix (ASC-US), Bilateral hand pain, Cervical high risk HPV (human papillomavirus) test positive, Hyperlipemia, Hyperlipidemia, and Insomnia.  has a past surgical history that includes Tubal ligation.    She reports abdominal pain bilateral lower & epigastric pain x 3 weeks. Occurs about 3 x per week. Pain can range from lasting for a few minutes to all day. Denies any new medications. Alleviating factors: none. Aggravating factors: none. Pain is described as pressure & burning. Pain is rated as a 8/10. Reports intermittent diarrhea alternating with constipation ( does not take anything). Denies n/v, fevers, chills.    She also reports intermittent  SOB and palpitations  "started about 3 months ago but worsened about 2 weeks ago. Aggravated by exertion. She states that one day, her BP was elevated in the 150s/80s. Reports mom sees cardiologist but she is unsure why. Denies smoking, but used to smoke (started at 13 years old but stopped 6 years ago). She used to smoke 0.5 PPD. Denies second hand smoke except for THC. Denies PICA.    LMP was 2 weeks ago and lasted 2-3 weeks. She was going through many pads per day because her period was heavier than normal. She had [place] in [place].        Review of Systems   Constitutional:  Negative for chills and fever.   HENT:  Negative for ear pain and sore throat.    Eyes:  Negative for pain and visual disturbance.   Respiratory:  Negative for cough and shortness of breath.    Cardiovascular:  Negative for chest pain and palpitations.   Gastrointestinal:  Negative for abdominal pain and vomiting.   Genitourinary:  Negative for dysuria and hematuria.   Musculoskeletal:  Negative for arthralgias and back pain.   Skin:  Negative for color change and rash.   Neurological:  Negative for seizures and syncope.   All other systems reviewed and are negative.      Objective   /84 (BP Location: Left arm, Patient Position: Sitting, Cuff Size: Large)   Pulse 98   Temp 97.8 °F (36.6 °C) (Temporal)   Resp 18   Ht 5' 6\" (1.676 m)   Wt 97.1 kg (214 lb)   SpO2 97%   BMI 34.54 kg/m²      Physical Exam  Vitals and nursing note reviewed.   Constitutional:       General: She is not in acute distress.     Appearance: Normal appearance. She is well-developed. She is obese.   HENT:      Head: Normocephalic and atraumatic.      Right Ear: External ear normal.      Left Ear: External ear normal.      Nose: Nose normal.   Eyes:      Conjunctiva/sclera: Conjunctivae normal.   Cardiovascular:      Rate and Rhythm: Normal rate and regular rhythm.      Pulses: Normal pulses.      Heart sounds: Normal heart sounds. No murmur heard.  Pulmonary:      Effort: Pulmonary effort " is normal. No respiratory distress.      Breath sounds: Normal breath sounds.   Abdominal:      Palpations: Abdomen is soft.      Tenderness: There is abdominal tenderness in the right upper quadrant, epigastric area and left upper quadrant.   Musculoskeletal:         General: Normal range of motion.      Cervical back: Normal range of motion and neck supple.   Skin:     General: Skin is warm and dry.   Neurological:      Mental Status: She is alert and oriented to person, place, and time. Mental status is at baseline.   Psychiatric:         Mood and Affect: Mood normal.         Behavior: Behavior normal.         Thought Content: Thought content normal.         Judgment: Judgment normal.

## 2025-04-30 ENCOUNTER — OFFICE VISIT (OUTPATIENT)
Dept: OBGYN CLINIC | Facility: CLINIC | Age: 45
End: 2025-04-30

## 2025-04-30 VITALS — DIASTOLIC BLOOD PRESSURE: 88 MMHG | WEIGHT: 214.2 LBS | SYSTOLIC BLOOD PRESSURE: 124 MMHG | BODY MASS INDEX: 34.57 KG/M2

## 2025-04-30 DIAGNOSIS — N93.9 ABNORMAL UTERINE BLEEDING: Primary | ICD-10-CM

## 2025-04-30 DIAGNOSIS — Z23 NEED FOR HPV VACCINE: ICD-10-CM

## 2025-04-30 DIAGNOSIS — D25.1 INTRAMURAL LEIOMYOMA OF UTERUS: ICD-10-CM

## 2025-04-30 PROCEDURE — 99213 OFFICE O/P EST LOW 20 MIN: CPT | Performed by: OBSTETRICS & GYNECOLOGY

## 2025-04-30 PROCEDURE — 90471 IMMUNIZATION ADMIN: CPT | Performed by: OBSTETRICS & GYNECOLOGY

## 2025-04-30 PROCEDURE — 90651 9VHPV VACCINE 2/3 DOSE IM: CPT | Performed by: OBSTETRICS & GYNECOLOGY

## 2025-04-30 NOTE — PROGRESS NOTES
Subjective:     Matilde Rosas is a 45 y.o.  female who presents for ultrasound follow up in the setting of prolonged uterine bleeding. Her last period was 3/13-3/17 and then she started bleeding again from 3/24 to . Prior to this her menstrual cycles were regular (28-30d) and lasting about 5 days. Menarche occurred at age 9. She has known fibroids on her uterus.     Objective:    Vitals: Blood pressure 124/88, weight 97.2 kg (214 lb 3.2 oz), not currently breastfeeding.Body mass index is 34.57 kg/m².    Physical Exam  Constitutional:       Appearance: She is well-developed.   Cardiovascular:      Rate and Rhythm: Normal rate and regular rhythm.      Heart sounds: Normal heart sounds. No murmur heard.     No friction rub. No gallop.   Pulmonary:      Effort: Pulmonary effort is normal. No respiratory distress.      Breath sounds: No wheezing.   Abdominal:      Palpations: Abdomen is soft.      Tenderness: There is no abdominal tenderness.   Musculoskeletal:         General: No tenderness.   Neurological:      Mental Status: She is alert and oriented to person, place, and time.   Vitals reviewed.         Assessment/Plan:    Problem List Items Addressed This Visit       Intramural leiomyoma of uterus    Most recent ultrasound shows 4 fibroids with the largest being about 4cm. She has minimal bulk/pressure symptoms. Will continue to monitor.          Other Visit Diagnoses         Abnormal uterine bleeding    -  Primary    One time episode of abnormal bleeding. Will continue to observe and if it occurs again will plan for EMB.      Need for HPV vaccine        Relevant Orders    HPV Vaccine 9 valent IM (Completed)              Zachery Gardner MD  2025  2:09 PM

## 2025-04-30 NOTE — ASSESSMENT & PLAN NOTE
Most recent ultrasound shows 4 fibroids with the largest being about 4cm. She has minimal bulk/pressure symptoms. Will continue to monitor.

## 2025-05-01 ENCOUNTER — APPOINTMENT (OUTPATIENT)
Dept: LAB | Facility: CLINIC | Age: 45
End: 2025-05-01
Payer: COMMERCIAL

## 2025-05-01 DIAGNOSIS — E55.9 VITAMIN D DEFICIENCY: ICD-10-CM

## 2025-05-01 DIAGNOSIS — E66.9 OBESITY (BMI 30-39.9): ICD-10-CM

## 2025-05-01 DIAGNOSIS — R06.02 SHORTNESS OF BREATH: ICD-10-CM

## 2025-05-01 DIAGNOSIS — R00.2 PALPITATIONS: ICD-10-CM

## 2025-05-01 LAB
25(OH)D3 SERPL-MCNC: 18.8 NG/ML (ref 30–100)
ALBUMIN SERPL BCG-MCNC: 4.2 G/DL (ref 3.5–5)
ALP SERPL-CCNC: 53 U/L (ref 34–104)
ALT SERPL W P-5'-P-CCNC: 12 U/L (ref 7–52)
ANION GAP SERPL CALCULATED.3IONS-SCNC: 9 MMOL/L (ref 4–13)
AST SERPL W P-5'-P-CCNC: 16 U/L (ref 13–39)
BASOPHILS # BLD AUTO: 0.05 THOUSANDS/ÂΜL (ref 0–0.1)
BASOPHILS NFR BLD AUTO: 1 % (ref 0–1)
BILIRUB SERPL-MCNC: 0.39 MG/DL (ref 0.2–1)
BUN SERPL-MCNC: 8 MG/DL (ref 5–25)
CALCIUM SERPL-MCNC: 9.1 MG/DL (ref 8.4–10.2)
CHLORIDE SERPL-SCNC: 103 MMOL/L (ref 96–108)
CHOLEST SERPL-MCNC: 234 MG/DL (ref ?–200)
CO2 SERPL-SCNC: 26 MMOL/L (ref 21–32)
CREAT SERPL-MCNC: 0.7 MG/DL (ref 0.6–1.3)
EOSINOPHIL # BLD AUTO: 0.1 THOUSAND/ÂΜL (ref 0–0.61)
EOSINOPHIL NFR BLD AUTO: 1 % (ref 0–6)
ERYTHROCYTE [DISTWIDTH] IN BLOOD BY AUTOMATED COUNT: 13.7 % (ref 11.6–15.1)
EST. AVERAGE GLUCOSE BLD GHB EST-MCNC: 114 MG/DL
FERRITIN SERPL-MCNC: 8 NG/ML (ref 30–307)
GFR SERPL CREATININE-BSD FRML MDRD: 104 ML/MIN/1.73SQ M
GLUCOSE P FAST SERPL-MCNC: 98 MG/DL (ref 65–99)
HBA1C MFR BLD: 5.6 %
HCT VFR BLD AUTO: 40.6 % (ref 34.8–46.1)
HDLC SERPL-MCNC: 56 MG/DL
HGB BLD-MCNC: 12.5 G/DL (ref 11.5–15.4)
IMM GRANULOCYTES # BLD AUTO: 0.02 THOUSAND/UL (ref 0–0.2)
IMM GRANULOCYTES NFR BLD AUTO: 0 % (ref 0–2)
IRON SATN MFR SERPL: 11 % (ref 15–50)
IRON SERPL-MCNC: 52 UG/DL (ref 50–212)
LDLC SERPL CALC-MCNC: 125 MG/DL (ref 0–100)
LYMPHOCYTES # BLD AUTO: 1.83 THOUSANDS/ÂΜL (ref 0.6–4.47)
LYMPHOCYTES NFR BLD AUTO: 23 % (ref 14–44)
MCH RBC QN AUTO: 28 PG (ref 26.8–34.3)
MCHC RBC AUTO-ENTMCNC: 30.8 G/DL (ref 31.4–37.4)
MCV RBC AUTO: 91 FL (ref 82–98)
MONOCYTES # BLD AUTO: 0.5 THOUSAND/ÂΜL (ref 0.17–1.22)
MONOCYTES NFR BLD AUTO: 6 % (ref 4–12)
NEUTROPHILS # BLD AUTO: 5.32 THOUSANDS/ÂΜL (ref 1.85–7.62)
NEUTS SEG NFR BLD AUTO: 69 % (ref 43–75)
NONHDLC SERPL-MCNC: 178 MG/DL
NRBC BLD AUTO-RTO: 0 /100 WBCS
PLATELET # BLD AUTO: 227 THOUSANDS/UL (ref 149–390)
PMV BLD AUTO: 11.4 FL (ref 8.9–12.7)
POTASSIUM SERPL-SCNC: 3.9 MMOL/L (ref 3.5–5.3)
PROT SERPL-MCNC: 7.2 G/DL (ref 6.4–8.4)
RBC # BLD AUTO: 4.46 MILLION/UL (ref 3.81–5.12)
SODIUM SERPL-SCNC: 138 MMOL/L (ref 135–147)
TIBC SERPL-MCNC: 487.2 UG/DL (ref 250–450)
TRANSFERRIN SERPL-MCNC: 348 MG/DL (ref 203–362)
TRIGL SERPL-MCNC: 264 MG/DL (ref ?–150)
TSH SERPL DL<=0.05 MIU/L-ACNC: 3.06 UIU/ML (ref 0.45–4.5)
UIBC SERPL-MCNC: 435 UG/DL (ref 155–355)
WBC # BLD AUTO: 7.82 THOUSAND/UL (ref 4.31–10.16)

## 2025-05-01 PROCEDURE — 80061 LIPID PANEL: CPT

## 2025-05-01 PROCEDURE — 83036 HEMOGLOBIN GLYCOSYLATED A1C: CPT

## 2025-05-01 PROCEDURE — 36415 COLL VENOUS BLD VENIPUNCTURE: CPT

## 2025-05-01 PROCEDURE — 82306 VITAMIN D 25 HYDROXY: CPT

## 2025-05-01 PROCEDURE — 80053 COMPREHEN METABOLIC PANEL: CPT

## 2025-05-01 PROCEDURE — 83550 IRON BINDING TEST: CPT

## 2025-05-01 PROCEDURE — 85025 COMPLETE CBC W/AUTO DIFF WBC: CPT

## 2025-05-01 PROCEDURE — 83540 ASSAY OF IRON: CPT

## 2025-05-01 PROCEDURE — 82728 ASSAY OF FERRITIN: CPT

## 2025-05-01 PROCEDURE — 84443 ASSAY THYROID STIM HORMONE: CPT

## 2025-05-02 ENCOUNTER — RESULTS FOLLOW-UP (OUTPATIENT)
Dept: FAMILY MEDICINE CLINIC | Facility: CLINIC | Age: 45
End: 2025-05-02

## 2025-05-02 DIAGNOSIS — E55.9 VITAMIN D DEFICIENCY: ICD-10-CM

## 2025-05-02 DIAGNOSIS — E61.1 IRON DEFICIENCY: Primary | ICD-10-CM

## 2025-05-02 RX ORDER — FERROUS SULFATE 324(65)MG
324 TABLET, DELAYED RELEASE (ENTERIC COATED) ORAL
Qty: 90 TABLET | Refills: 0 | Status: SHIPPED | OUTPATIENT
Start: 2025-05-02

## 2025-05-12 ENCOUNTER — APPOINTMENT (OUTPATIENT)
Dept: LAB | Facility: HOSPITAL | Age: 45
End: 2025-05-12
Payer: COMMERCIAL

## 2025-05-12 DIAGNOSIS — R06.02 SHORTNESS OF BREATH: ICD-10-CM

## 2025-05-12 DIAGNOSIS — R00.2 PALPITATIONS: ICD-10-CM

## 2025-05-12 LAB
ATRIAL RATE: 77 BPM
P AXIS: 16 DEGREES
PR INTERVAL: 172 MS
QRS AXIS: 63 DEGREES
QRSD INTERVAL: 72 MS
QT INTERVAL: 400 MS
QTC INTERVAL: 453 MS
T WAVE AXIS: 46 DEGREES
VENTRICULAR RATE: 77 BPM

## 2025-05-12 PROCEDURE — 93010 ELECTROCARDIOGRAM REPORT: CPT | Performed by: STUDENT IN AN ORGANIZED HEALTH CARE EDUCATION/TRAINING PROGRAM

## 2025-05-22 ENCOUNTER — OFFICE VISIT (OUTPATIENT)
Dept: DENTISTRY | Facility: CLINIC | Age: 45
End: 2025-05-22

## 2025-05-22 VITALS — DIASTOLIC BLOOD PRESSURE: 78 MMHG | SYSTOLIC BLOOD PRESSURE: 137 MMHG | HEART RATE: 84 BPM

## 2025-05-22 DIAGNOSIS — Z01.20 ENCOUNTER FOR DENTAL EXAMINATION: Primary | ICD-10-CM

## 2025-05-22 PROCEDURE — D0150 COMPREHENSIVE ORAL EVALUATION - NEW OR ESTABLISHED PATIENT: HCPCS | Performed by: DENTIST

## 2025-05-22 PROCEDURE — D0210 INTRAORAL - COMPLETE SERIES OF RADIOGRAPHIC IMAGES: HCPCS

## 2025-05-22 NOTE — PROGRESS NOTES
COMP EXAM, FMX, PROBE EXAM   REVIEWED MED HX: meds, allergies, health changes reviewed in EPIC  CHIEF CONCERN:     -Last dental visit was a FEW YEARS AGO  PAIN SCALE:  3  ASA CLASS:  ASA 2 - Patient with mild systemic disease with no functional limitations  PLAQUE:  mild  CALCULUS:  Moderate  BLEEDING:  light- may be moderate when scaling  STAIN :  Light  PERIO: stage 2 grade B    Visual and Tactile Intraoral/ Extraoral evaluation: Oral and Oropharyngeal cancer evaluation. No findings     Dr. Meredith -  Reviewed with patient clinical and radiographic findings and patient verbalized understanding. All questions and concerns addressed.     REFERRALS: none    CARIES FINDINGS:     Fractured teeth # 30, 31  will need crowns - pre auth   # 30 needs sammi first  # 5 on BWX  decay noted on distal  MOD       NEXT VISIT:   1) init prophy  CAV     2)  sammi # 30  MOL    Pre auth CRS # 30, 31    Last FMX : 5/22/2025  NK

## 2025-05-23 ENCOUNTER — OFFICE VISIT (OUTPATIENT)
Dept: DENTISTRY | Facility: CLINIC | Age: 45
End: 2025-05-23

## 2025-05-23 VITALS — HEART RATE: 90 BPM | TEMPERATURE: 98.6 F | SYSTOLIC BLOOD PRESSURE: 135 MMHG | DIASTOLIC BLOOD PRESSURE: 91 MMHG

## 2025-05-23 DIAGNOSIS — K03.6 DENTAL CALCULUS: Primary | ICD-10-CM

## 2025-05-23 NOTE — PROGRESS NOTES
ADULT PROPHY , (no xrays due )   REVIEWED MED HX: meds, allergies, health changes reviewed in [facility]. All consents signed.  CHIEF CONCERN: no dental pain or concerns  PAIN SCALE:  0  ASA CLASS:  II  PLAQUE:  moderate  CALCULUS:  moderate  BLEEDING:   moderate  STAIN :   light      PERIO:   Mild bone loss and recession    Hygiene Procedures:  Scaled, Polished, Flossed and Used Cavitron    Oral Hygiene Instruction: Brushing Minimum 2x daily for 2 minutes, daily flossing, Listerine, and Recommended soft toothbrush only    Dispensed: Toothbrush, Toothpaste, Floss    Visual and Tactile Intraoral/ Extraoral evaluation: Oral and Oropharyngeal cancer evaluation. No findings     No exam   Reviewed with patient clinical and radiographic findings and patient verbalized understanding. All questions and concerns addressed.     REFERRALS: none    CARIES FINDINGS: see tooth chart       TREATMENT  PLAN :   NV1:  Rest 30 - 60 min  NV2:  6mrc - no xrays - 50 min    Last BWX:   Last [facility]  Last FMX :  5/22/25

## 2025-05-27 ENCOUNTER — OFFICE VISIT (OUTPATIENT)
Dept: FAMILY MEDICINE CLINIC | Facility: CLINIC | Age: 45
End: 2025-05-27

## 2025-05-27 VITALS
TEMPERATURE: 98.1 F | OXYGEN SATURATION: 98 % | DIASTOLIC BLOOD PRESSURE: 78 MMHG | HEART RATE: 91 BPM | BODY MASS INDEX: 34.87 KG/M2 | SYSTOLIC BLOOD PRESSURE: 126 MMHG | RESPIRATION RATE: 18 BRPM | HEIGHT: 66 IN | WEIGHT: 217 LBS

## 2025-05-27 DIAGNOSIS — R00.2 PALPITATIONS: ICD-10-CM

## 2025-05-27 DIAGNOSIS — E55.9 VITAMIN D DEFICIENCY: ICD-10-CM

## 2025-05-27 DIAGNOSIS — R06.02 SOB (SHORTNESS OF BREATH): ICD-10-CM

## 2025-05-27 DIAGNOSIS — E61.1 IRON DEFICIENCY: Primary | ICD-10-CM

## 2025-05-27 PROCEDURE — 99214 OFFICE O/P EST MOD 30 MIN: CPT

## 2025-05-27 NOTE — PROGRESS NOTES
"Name: Matilde Rosas      : 1980      MRN: [id number]  Encounter Provider: BRITTANY Narvaez  Encounter Date: 2025   Encounter department: [facility]  :  Assessment & Plan  Iron deficiency  Not anemic. Recommend increasing iron rich foods and taking otc iron.        SOB (shortness of breath)    Orders:    Complete PFT with post bronchodilator; Future    Palpitations    Orders:    Complete PFT with post bronchodilator; Future    Vitamin D deficiency  Continue Vitamin D.              History of Present Illness   Matilde Rosas is a 45 y.o. female  has a past medical history of Abnormal uterine bleeding, Atypical squamous cells of undetermined significance on cytologic smear of cervix (ASC-US), Bilateral hand pain, Cervical high risk HPV (human papillomavirus) test positive, Hyperlipemia, Hyperlipidemia, and Insomnia.  has a past surgical history that includes Tubal ligation.    She presents today to follow-up on epigastric pain, SOB, & palpitations. Recent labs showed vitamin D deficiency & iron deficiency. She reports that she has used Pepcid for epigastric pain which is effective. She is compliant with Vitamin D  but unfortunately, she does not tolerate iron very well because it causes constipation and abdominal pain so she does not take it all the time. She continues to have sob and palpitations but has not noted it as much. She is not sure if it occurs at the same time as epigastric pain. As per my last note, she has a hx of smoking.       Review of Systems  As per HPI  Objective   /78 (BP Location: Left arm, Patient Position: Sitting, Cuff Size: Large)   Pulse 91   Temp 98.1 °F (36.7 °C) (Temporal)   Resp 18   Ht 5' 6\" (1.676 m)   Wt 98.4 kg (217 lb)   SpO2 98%   BMI 35.02 kg/m²      Physical Exam  Vitals and nursing note reviewed.   Constitutional:       General: She is not in acute distress.     Appearance: Normal appearance. She " is well-developed.   HENT:      Head: Normocephalic and atraumatic.      Right Ear: External ear normal.      Left Ear: External ear normal.      Nose: Nose normal.     Eyes:      Conjunctiva/sclera: Conjunctivae normal.       Cardiovascular:      Rate and Rhythm: Normal rate and regular rhythm.      Pulses: Normal pulses.      Heart sounds: Normal heart sounds. No murmur heard.  Pulmonary:      Effort: Pulmonary effort is normal. No respiratory distress.      Breath sounds: Normal breath sounds.     Musculoskeletal:         General: Normal range of motion.      Cervical back: Normal range of motion and neck supple.     Skin:     General: Skin is warm and dry.     Neurological:      Mental Status: She is alert and oriented to person, place, and time. Mental status is at baseline.     Psychiatric:         Mood and Affect: Mood normal.         Behavior: Behavior normal.         Thought Content: Thought content normal.         Judgment: Judgment normal.

## 2025-06-05 RX ORDER — ALBUTEROL SULFATE 0.83 MG/ML
2.5 SOLUTION RESPIRATORY (INHALATION) ONCE
Status: COMPLETED | OUTPATIENT
Start: 2025-06-05 | End: 2025-06-09

## 2025-06-09 ENCOUNTER — HOSPITAL ENCOUNTER (OUTPATIENT)
Dept: PULMONOLOGY | Facility: HOSPITAL | Age: 45
Discharge: HOME | End: 2025-06-09
Payer: COMMERCIAL

## 2025-06-09 DIAGNOSIS — R00.2 PALPITATIONS: ICD-10-CM

## 2025-06-09 DIAGNOSIS — R06.02 SOB (SHORTNESS OF BREATH): ICD-10-CM

## 2025-06-09 PROCEDURE — 94060 EVALUATION OF WHEEZING: CPT

## 2025-06-09 PROCEDURE — 94726 PLETHYSMOGRAPHY LUNG VOLUMES: CPT

## 2025-06-09 PROCEDURE — 94729 DIFFUSING CAPACITY: CPT

## 2025-06-09 PROCEDURE — 94760 N-INVAS EAR/PLS OXIMETRY 1: CPT

## 2025-06-09 PROCEDURE — 94726 PLETHYSMOGRAPHY LUNG VOLUMES: CPT | Performed by: INTERNAL MEDICINE

## 2025-06-09 PROCEDURE — 94729 DIFFUSING CAPACITY: CPT | Performed by: INTERNAL MEDICINE

## 2025-06-09 PROCEDURE — 94060 EVALUATION OF WHEEZING: CPT | Performed by: INTERNAL MEDICINE

## 2025-06-09 RX ADMIN — ALBUTEROL SULFATE 2.5 MG: 2.5 SOLUTION RESPIRATORY (INHALATION) at 11:39

## 2025-06-12 ENCOUNTER — RESULTS FOLLOW-UP (OUTPATIENT)
Dept: FAMILY MEDICINE CLINIC | Facility: CLINIC | Age: 45
End: 2025-06-12

## 2025-07-08 ENCOUNTER — OFFICE VISIT (OUTPATIENT)
Dept: FAMILY MEDICINE CLINIC | Facility: CLINIC | Age: 45
End: 2025-07-08

## 2025-07-08 VITALS
HEIGHT: 66 IN | TEMPERATURE: 97.7 F | BODY MASS INDEX: 34.55 KG/M2 | WEIGHT: 215 LBS | SYSTOLIC BLOOD PRESSURE: 126 MMHG | DIASTOLIC BLOOD PRESSURE: 86 MMHG | HEART RATE: 88 BPM | OXYGEN SATURATION: 98 % | RESPIRATION RATE: 18 BRPM

## 2025-07-08 DIAGNOSIS — R00.2 PALPITATIONS: ICD-10-CM

## 2025-07-08 DIAGNOSIS — R06.02 SOB (SHORTNESS OF BREATH): Primary | ICD-10-CM

## 2025-07-08 DIAGNOSIS — M54.31 SCIATICA OF RIGHT SIDE: ICD-10-CM

## 2025-07-08 PROCEDURE — 99214 OFFICE O/P EST MOD 30 MIN: CPT

## 2025-07-08 RX ORDER — ALBUTEROL SULFATE 90 UG/1
2 INHALANT RESPIRATORY (INHALATION) EVERY 6 HOURS PRN
Qty: 18 G | Refills: 5 | Status: SHIPPED | OUTPATIENT
Start: 2025-07-08

## 2025-07-08 RX ORDER — GABAPENTIN 100 MG/1
100 CAPSULE ORAL 3 TIMES DAILY
Qty: 90 CAPSULE | Refills: 0 | Status: SHIPPED | OUTPATIENT
Start: 2025-07-08

## 2025-07-08 NOTE — PROGRESS NOTES
"Name: Matilde Rosas      : 1980      MRN: [id number]  Encounter Provider: BRITTANY Narvaez  Encounter Date: 2025   Encounter department: [facility]  :  Assessment & Plan  SOB (shortness of breath)  F/u pulmonology as scheduled.   Orders:    albuterol (Ventolin HFA) 90 mcg/act inhaler; Inhale 2 puffs every 6 (six) hours as needed for wheezing    Palpitations  See SOB a/p  Orders:    albuterol (Ventolin HFA) 90 mcg/act inhaler; Inhale 2 puffs every 6 (six) hours as needed for wheezing    Sciatica of right side  Given short symptoms duration, trial gabapentin for ST relief. If symptoms persist/worsen, recommend PT.  Orders:    gabapentin (NEURONTIN) 100 mg capsule; Take 1 capsule (100 mg total) by mouth 3 (three) times a day           History of Present Illness   Here today for a SOB & palpitations follow-up. PFTs showed  a non-specific mildly restrictive pattern. She has an appt scheduled with pulmonology at the end of next week. She continues to have SOB and palpitations.   Today, she also reports right sided numbness and weakness of her right leg, aggravated when walking. Denies low back pain,  tingling, burning- electric shock. She has not tried any medications. She usually sits down and waits which helps.       Review of Systems  As per HPI    Objective   /86 (BP Location: Left arm, Patient Position: Sitting, Cuff Size: Large)   Pulse 88   Temp 97.7 °F (36.5 °C) (Temporal)   Resp 18   Ht 5' 6\" (1.676 m)   Wt 97.5 kg (215 lb)   SpO2 98%   BMI 34.70 kg/m²      Physical Exam  Vitals and nursing note reviewed.   Constitutional:       General: She is not in acute distress.     Appearance: Normal appearance. She is well-developed.   HENT:      Head: Normocephalic and atraumatic.      Right Ear: External ear normal.      Left Ear: External ear normal.      Nose: Nose normal.     Eyes:      Conjunctiva/sclera: Conjunctivae normal. "       Cardiovascular:      Rate and Rhythm: Normal rate.   Pulmonary:      Effort: Pulmonary effort is normal.     Musculoskeletal:         General: No swelling.      Cervical back: Normal range of motion and neck supple.      Lumbar back: Positive right straight leg raise test.     Skin:     General: Skin is warm.     Neurological:      General: No focal deficit present.      Mental Status: She is alert and oriented to person, place, and time. Mental status is at baseline.     Psychiatric:         Mood and Affect: Mood normal.         Behavior: Behavior normal.         Thought Content: Thought content normal.         Judgment: Judgment normal.

## 2025-07-08 NOTE — ASSESSMENT & PLAN NOTE
F/u pulmonology as scheduled.   Orders:    albuterol (Ventolin HFA) 90 mcg/act inhaler; Inhale 2 puffs every 6 (six) hours as needed for wheezing

## 2025-07-14 ENCOUNTER — TELEPHONE (OUTPATIENT)
Dept: OTHER | Facility: OTHER | Age: 45
End: 2025-07-14

## 2025-07-14 NOTE — TELEPHONE ENCOUNTER
Patient is calling regarding cancelling an appointment.    Date/Time: 7/14 10 am    Patient was rescheduled: YES [] NO [x]    Patient requesting call back to reschedule: YES [x] NO []

## 2025-07-18 ENCOUNTER — CONSULT (OUTPATIENT)
Dept: PULMONOLOGY | Facility: CLINIC | Age: 45
End: 2025-07-18
Payer: COMMERCIAL

## 2025-07-18 VITALS
HEIGHT: 66 IN | DIASTOLIC BLOOD PRESSURE: 76 MMHG | OXYGEN SATURATION: 98 % | WEIGHT: 212.3 LBS | HEART RATE: 90 BPM | BODY MASS INDEX: 34.12 KG/M2 | SYSTOLIC BLOOD PRESSURE: 126 MMHG | TEMPERATURE: 96.3 F

## 2025-07-18 DIAGNOSIS — R06.02 SOB (SHORTNESS OF BREATH): ICD-10-CM

## 2025-07-18 PROCEDURE — 99244 OFF/OP CNSLTJ NEW/EST MOD 40: CPT | Performed by: INTERNAL MEDICINE

## 2025-07-18 NOTE — ASSESSMENT & PLAN NOTE
Matilde describes primarily chest pressure and pain with radiation during activity with some associated shortness of breath.  Her PFTs are consistent only with restriction which is likely due to weight but merits further evaluation with imaging.    Check CXR PA/lateral  Advised to re-try albuterol prior to activity and assess for any benefit  If nothing on CXR and albuterol ineffective, would refer to Cardiology to get an opinion on her chest pressure

## 2025-07-18 NOTE — PROGRESS NOTES
Pulmonary Consultation   Matilde Rosas 45 y.o. female MRN: [id number]  7/18/2025    Assessment:    SOB (shortness of breath)  Matilde describes primarily chest pressure and pain with radiation during activity with some associated shortness of breath.  Her PFTs are consistent only with restriction which is likely due to weight but merits further evaluation with imaging.    Check CXR PA/lateral  Advised to re-try albuterol prior to activity and assess for any benefit  If nothing on CXR and albuterol ineffective, would refer to Cardiology to get an opinion on her chest pressure    Plan:    Diagnoses and all orders for this visit:    SOB (shortness of breath)  -     Ambulatory Referral to Pulmonology  -     XR chest pa and lateral; Future    Follow up in 6-8 weeks.    History of Present Illness   HPI:  Matilde Rosas is a 45 y.o. female who presents for evaluation of dyspnea and chest pressure.    She reports she has been dealing with these symptoms for about a year which have been progressively worsening with time.  She notes pressure in her central chest which will radiate down into both arms with activity, lasting 5-6 minutes before relenting.  It does not happen every time she is active, but when it does, she only needs to walk a short distance for it to occur.    She is a former smoker, started at age 13, smoked until 38, at 1.5 PPD.      Her PCP set her up with albuterol, which she only used once because it caused a sensation of dizziness, and the next day she reports her symptoms were worse than the day she used the inhaler.      She does feel like hot and humid days are worse for her breathing, but has no other clear environmental triggers.    Her medical history is otherwise fairly unremarkable.  She has no history of thoracic surgeries.  Her smoking history is as above, she reports no history of occupational exposures to dusts or chemicals.  There is no family history of coronary disease to her  "knowledge.  There is a history of asthma in her sister and daughter.      Review of Systems   Respiratory:  Positive for chest tightness and shortness of breath. Negative for cough and wheezing.    All other systems reviewed and are negative.      Historical Information   Past Medical History[1]  Past Surgical History[1]  Family History[1]    Meds/Allergies   Current Medications[1]  Allergies[1]    Vitals: Blood pressure 126/76, pulse 90, temperature (!) 96.3 °F (35.7 °C), temperature source Tympanic Core, height 5' 6\" (1.676 m), weight 96.3 kg (212 lb 4.8 oz), SpO2 98%, not currently breastfeeding. Body mass index is 34.27 kg/m². Oxygen Therapy  SpO2: 98 %  Oxygen Therapy: None (Room air)    Physical Exam  Physical Exam  Vitals reviewed.   Constitutional:       General: She is not in acute distress.     Appearance: Normal appearance. She is well-developed. She is not ill-appearing.   HENT:      Head: Normocephalic and atraumatic.     Eyes:      General: No scleral icterus.     Conjunctiva/sclera: Conjunctivae normal.     Neck:      Vascular: No JVD.     Cardiovascular:      Rate and Rhythm: Normal rate and regular rhythm.      Heart sounds: Normal heart sounds. No murmur heard.     No friction rub. No gallop.   Pulmonary:      Effort: Pulmonary effort is normal. No respiratory distress.      Breath sounds: Normal breath sounds. No wheezing or rales.     Musculoskeletal:      Cervical back: Neck supple.      Right lower leg: No edema.      Left lower leg: No edema.     Skin:     General: Skin is warm and dry.      Findings: No rash.     Neurological:      General: No focal deficit present.      Mental Status: She is alert and oriented to person, place, and time. Mental status is at baseline.     Psychiatric:         Mood and Affect: Mood normal.         Behavior: Behavior normal.     Labs: I have personally reviewed pertinent lab results.  Lab Results   Component Value Date    WBC 7.82 05/01/2025    HGB 12.5 " "05/01/2025    HCT 40.6 05/01/2025    MCV 91 05/01/2025     05/01/2025     Lab Results   Component Value Date    CALCIUM 9.1 05/01/2025    K 3.9 05/01/2025    CO2 26 05/01/2025     05/01/2025    BUN 8 05/01/2025    CREATININE 0.70 05/01/2025     No results found for: \"IGE\"  Lab Results   Component Value Date    ALT 12 05/01/2025    AST 16 05/01/2025    ALKPHOS 53 05/01/2025       Imaging and other studies: I have personally reviewed relevant images in PACS.    EKG, Pathology, and Other Studies: I have personally reviewed relevant reports in Epic.    Arturo Quinn M.D.  [facility]       [1]  Past Medical History:  Diagnosis Date   • Abnormal uterine bleeding 10/24/2019   • Atypical squamous cells of undetermined significance on cytologic smear of cervix (ASC-US) 9/18/2019    + AGC + HPV other positive   • Bilateral hand pain 7/17/2018   • Cervical high risk HPV (human papillomavirus) test positive 12/11/2018    PAP WNL needs re-PAP and co-testing in 1 year   • Hyperlipemia    • Hyperlipidemia 10/27/2017   • Insomnia 6/3/2020   [1]  Past Surgical History:  Procedure Laterality Date   • TUBAL LIGATION     [1]  Family History  Problem Relation Name Age of Onset   • Hyperlipidemia Mother Shari    • Hypertension Mother Shari    • Colon cancer Father Giovanny    • Cancer Father Giovanny    • Asthma Sister Bethzabe    • Diabetes Maternal Grandmother Margoth    • Cancer Maternal Grandfather Giovanny    • No Known Problems Paternal Grandmother     • No Known Problems Paternal Grandfather     • Diabetes Maternal Aunt Estephania    • No Known Problems Maternal Aunt     • No Known Problems Maternal Aunt     • Colon cancer Maternal Uncle     • No Known Problems Paternal Aunt     • No Known Problems Paternal Aunt     • No Known Problems Paternal Aunt     • Breast cancer Neg Hx     • Breast cancer additional onset Neg Hx     • Endometrial cancer Neg Hx     • BRCA2 Positive Neg Hx     • " BRCA2 Negative Neg Hx     • BRCA1 Positive Neg Hx     • BRCA1 Negative Neg Hx     • BRCA 1/2 Neg Hx     • Ovarian cancer Neg Hx     [1]    Current Outpatient Medications:   •  albuterol (Ventolin HFA) 90 mcg/act inhaler, Inhale 2 puffs every 6 (six) hours as needed for wheezing, Disp: 18 g, Rfl: 5  •  Cholecalciferol (VITAMIN D3) 1,000 units tablet, Take 1 tablet (1,000 Units total) by mouth daily, Disp: 90 tablet, Rfl: 1  •  fluticasone (FLONASE) 50 mcg/act nasal spray, 2 sprays into each nostril daily, Disp: 16 g, Rfl: 5  •  gabapentin (NEURONTIN) 100 mg capsule, Take 1 capsule (100 mg total) by mouth 3 (three) times a day, Disp: 90 capsule, Rfl: 0  •  meclizine (ANTIVERT) 12.5 MG tablet, Take 1 tablet (12.5 mg total) by mouth 3 (three) times a day as needed for dizziness, Disp: 90 tablet, Rfl: 0    Current Facility-Administered Medications:   •  cyanocobalamin injection 1,000 mcg, 1,000 mcg, Intramuscular, Q30 Days, Kelli Browne PA-C, 1,000 mcg at 09/02/20 1000[1]  No Known Allergies

## 2025-08-01 ENCOUNTER — HOSPITAL ENCOUNTER (OUTPATIENT)
Dept: RADIOLOGY | Facility: HOSPITAL | Age: 45
Discharge: HOME | End: 2025-08-01
Payer: COMMERCIAL

## 2025-08-01 DIAGNOSIS — R06.02 SOB (SHORTNESS OF BREATH): ICD-10-CM

## 2025-08-01 PROCEDURE — 71046 X-RAY EXAM CHEST 2 VIEWS: CPT

## 2025-08-05 ENCOUNTER — APPOINTMENT (OUTPATIENT)
Dept: LAB | Facility: CLINIC | Age: 45
End: 2025-08-05
Payer: COMMERCIAL

## 2025-08-05 DIAGNOSIS — E61.1 IRON DEFICIENCY: ICD-10-CM

## 2025-08-05 DIAGNOSIS — E55.9 VITAMIN D DEFICIENCY: ICD-10-CM

## 2025-08-05 LAB
25(OH)D3 SERPL-MCNC: 21.5 NG/ML (ref 30–100)
FERRITIN SERPL-MCNC: 13 NG/ML (ref 30–307)
IRON SATN MFR SERPL: 10 % (ref 15–50)
IRON SERPL-MCNC: 47 UG/DL (ref 50–212)
TIBC SERPL-MCNC: 474.6 UG/DL (ref 250–450)
TRANSFERRIN SERPL-MCNC: 339 MG/DL (ref 203–362)
UIBC SERPL-MCNC: 428 UG/DL (ref 155–355)

## 2025-08-05 PROCEDURE — 82306 VITAMIN D 25 HYDROXY: CPT

## 2025-08-05 PROCEDURE — 82728 ASSAY OF FERRITIN: CPT

## 2025-08-05 PROCEDURE — 83540 ASSAY OF IRON: CPT

## 2025-08-05 PROCEDURE — 36415 COLL VENOUS BLD VENIPUNCTURE: CPT

## 2025-08-05 PROCEDURE — 83550 IRON BINDING TEST: CPT
